# Patient Record
Sex: MALE | HISPANIC OR LATINO | URBAN - METROPOLITAN AREA
[De-identification: names, ages, dates, MRNs, and addresses within clinical notes are randomized per-mention and may not be internally consistent; named-entity substitution may affect disease eponyms.]

---

## 2023-03-08 ENCOUNTER — OFFICE (OUTPATIENT)
Dept: URBAN - METROPOLITAN AREA CLINIC 94 | Facility: CLINIC | Age: 73
Setting detail: OPHTHALMOLOGY
End: 2023-03-08
Payer: MEDICAID

## 2023-03-08 DIAGNOSIS — H35.033: ICD-10-CM

## 2023-03-08 DIAGNOSIS — H16.222: ICD-10-CM

## 2023-03-08 DIAGNOSIS — H16.221: ICD-10-CM

## 2023-03-08 DIAGNOSIS — H35.373: ICD-10-CM

## 2023-03-08 DIAGNOSIS — H01.001: ICD-10-CM

## 2023-03-08 DIAGNOSIS — H16.223: ICD-10-CM

## 2023-03-08 DIAGNOSIS — H01.004: ICD-10-CM

## 2023-03-08 PROCEDURE — 83861 MICROFLUID ANALY TEARS: CPT | Performed by: OPHTHALMOLOGY

## 2023-03-08 PROCEDURE — 92014 COMPRE OPH EXAM EST PT 1/>: CPT | Performed by: OPHTHALMOLOGY

## 2023-03-08 ASSESSMENT — REFRACTION_MANIFEST
OD_VA1: 20/20
OS_VA1: 20/20
OS_ADD: +2.25
OD_CYLINDER: -0.50
OD_VA2: 20/20
OD_AXIS: 100
OD_SPHERE: PLANO
OS_SPHERE: PLANO
OS_ADD: +2.25
OD_ADD: +2.25
OD_SPHERE: PL
OS_CYLINDER: -0.50
OS_SPHERE: +2.00
OS_VA1: 20/20
OS_VA2: 20/20
OD_VA1: 20/20
OS_AXIS: 85
OD_ADD: +2.25

## 2023-03-08 ASSESSMENT — REFRACTION_AUTOREFRACTION
OS_SPHERE: 0.00
OS_CYLINDER: 0.00
OD_CYLINDER: -0.75
OD_SPHERE: 0.00
OS_AXIS: 000
OD_AXIS: 089

## 2023-03-08 ASSESSMENT — REFRACTION_CURRENTRX
OS_CYLINDER: -0.75
OS_OVR_VA: 20/
OS_ADD: +3.25
OS_OVR_VA: 20/
OD_SPHERE: +1.50
OD_ADD: +2.75
OD_OVR_VA: 20/
OD_OVR_VA: 20/
OS_SPHERE: +2.50
OS_ADD: +2.00
OD_ADD: +2.00
OD_CYLINDER: -0.50
OD_AXIS: 092
OS_VPRISM_DIRECTION: PROGS
OD_VPRISM_DIRECTION: PROGS
OS_AXIS: 077

## 2023-03-08 ASSESSMENT — KERATOMETRY
OD_K2POWER_DIOPTERS: 44.25
OD_AXISANGLE_DEGREES: 090
OS_K1POWER_DIOPTERS: 44.00
OD_K1POWER_DIOPTERS: 44.25
OS_AXISANGLE_DEGREES: 072
METHOD_AUTO_MANUAL: AUTO
OS_K2POWER_DIOPTERS: 44.50

## 2023-03-08 ASSESSMENT — AXIALLENGTH_DERIVED
OS_AL: 22.6719
OD_AL: 23.4632
OS_AL: 23.3196

## 2023-03-08 ASSESSMENT — SPHEQUIV_DERIVED
OS_SPHEQUIV: 0
OS_SPHEQUIV: 1.75
OD_SPHEQUIV: -0.375

## 2023-03-08 ASSESSMENT — VISUAL ACUITY
OD_BCVA: 20/20
OS_BCVA: 20/20-1

## 2023-03-08 ASSESSMENT — LID EXAM ASSESSMENTS
OD_BLEPHARITIS: T
OS_BLEPHARITIS: T

## 2023-03-08 ASSESSMENT — CONFRONTATIONAL VISUAL FIELD TEST (CVF)
OD_FINDINGS: FULL
OS_FINDINGS: FULL

## 2023-03-08 ASSESSMENT — SUPERFICIAL PUNCTATE KERATITIS (SPK)
OS_SPK: T
OD_SPK: 1+

## 2023-03-08 ASSESSMENT — TONOMETRY
OS_IOP_MMHG: 14
OD_IOP_MMHG: 14

## 2023-03-17 ENCOUNTER — EMERGENCY (EMERGENCY)
Facility: HOSPITAL | Age: 73
LOS: 1 days | Discharge: HOME | End: 2023-03-17
Attending: EMERGENCY MEDICINE
Payer: COMMERCIAL

## 2023-03-17 VITALS
HEIGHT: 68 IN | TEMPERATURE: 102 F | RESPIRATION RATE: 20 BRPM | HEART RATE: 120 BPM | WEIGHT: 164.91 LBS | SYSTOLIC BLOOD PRESSURE: 120 MMHG | DIASTOLIC BLOOD PRESSURE: 65 MMHG | OXYGEN SATURATION: 100 %

## 2023-03-17 VITALS
SYSTOLIC BLOOD PRESSURE: 115 MMHG | DIASTOLIC BLOOD PRESSURE: 67 MMHG | TEMPERATURE: 100 F | RESPIRATION RATE: 16 BRPM | HEART RATE: 95 BPM | OXYGEN SATURATION: 98 %

## 2023-03-17 LAB
ALBUMIN SERPL ELPH-MCNC: 3.9 G/DL — SIGNIFICANT CHANGE UP (ref 3.3–5.2)
ALP SERPL-CCNC: 70 U/L — SIGNIFICANT CHANGE UP (ref 40–120)
ALT FLD-CCNC: 15 U/L — SIGNIFICANT CHANGE UP
ANION GAP SERPL CALC-SCNC: 11 MMOL/L — SIGNIFICANT CHANGE UP (ref 5–17)
ANISOCYTOSIS BLD QL: SLIGHT — SIGNIFICANT CHANGE UP
APPEARANCE UR: CLEAR — SIGNIFICANT CHANGE UP
APTT BLD: 22.3 SEC — LOW (ref 27.5–35.5)
AST SERPL-CCNC: 21 U/L — SIGNIFICANT CHANGE UP
BASOPHILS # BLD AUTO: 0.04 K/UL — SIGNIFICANT CHANGE UP (ref 0–0.2)
BASOPHILS NFR BLD AUTO: 0.6 % — SIGNIFICANT CHANGE UP (ref 0–2)
BILIRUB SERPL-MCNC: 0.5 MG/DL — SIGNIFICANT CHANGE UP (ref 0.4–2)
BILIRUB UR-MCNC: NEGATIVE — SIGNIFICANT CHANGE UP
BUN SERPL-MCNC: 21.3 MG/DL — HIGH (ref 8–20)
CALCIUM SERPL-MCNC: 8.5 MG/DL — SIGNIFICANT CHANGE UP (ref 8.4–10.5)
CHLORIDE SERPL-SCNC: 100 MMOL/L — SIGNIFICANT CHANGE UP (ref 96–108)
CO2 SERPL-SCNC: 27 MMOL/L — SIGNIFICANT CHANGE UP (ref 22–29)
COLOR SPEC: YELLOW — SIGNIFICANT CHANGE UP
CREAT SERPL-MCNC: 1.22 MG/DL — SIGNIFICANT CHANGE UP (ref 0.5–1.3)
DIFF PNL FLD: NEGATIVE — SIGNIFICANT CHANGE UP
EGFR: 63 ML/MIN/1.73M2 — SIGNIFICANT CHANGE UP
EOSINOPHIL # BLD AUTO: 0.03 K/UL — SIGNIFICANT CHANGE UP (ref 0–0.5)
EOSINOPHIL NFR BLD AUTO: 0.4 % — SIGNIFICANT CHANGE UP (ref 0–6)
GLUCOSE SERPL-MCNC: 104 MG/DL — HIGH (ref 70–99)
GLUCOSE UR QL: NEGATIVE MG/DL — SIGNIFICANT CHANGE UP
HCT VFR BLD CALC: 39.2 % — SIGNIFICANT CHANGE UP (ref 39–50)
HGB BLD-MCNC: 12.1 G/DL — LOW (ref 13–17)
HYPOCHROMIA BLD QL: SLIGHT — SIGNIFICANT CHANGE UP
IMM GRANULOCYTES NFR BLD AUTO: 0.1 % — SIGNIFICANT CHANGE UP (ref 0–0.9)
INR BLD: 1.09 RATIO — SIGNIFICANT CHANGE UP (ref 0.88–1.16)
KETONES UR-MCNC: ABNORMAL
LEUKOCYTE ESTERASE UR-ACNC: NEGATIVE — SIGNIFICANT CHANGE UP
LYMPHOCYTES # BLD AUTO: 0.71 K/UL — LOW (ref 1–3.3)
LYMPHOCYTES # BLD AUTO: 10.3 % — LOW (ref 13–44)
MACROCYTES BLD QL: SLIGHT — SIGNIFICANT CHANGE UP
MANUAL SMEAR VERIFICATION: SIGNIFICANT CHANGE UP
MCHC RBC-ENTMCNC: 23.1 PG — LOW (ref 27–34)
MCHC RBC-ENTMCNC: 30.9 GM/DL — LOW (ref 32–36)
MCV RBC AUTO: 74.8 FL — LOW (ref 80–100)
MICROCYTES BLD QL: SLIGHT — SIGNIFICANT CHANGE UP
MONOCYTES # BLD AUTO: 0.07 K/UL — SIGNIFICANT CHANGE UP (ref 0–0.9)
MONOCYTES NFR BLD AUTO: 1 % — LOW (ref 2–14)
NEUTROPHILS # BLD AUTO: 6.01 K/UL — SIGNIFICANT CHANGE UP (ref 1.8–7.4)
NEUTROPHILS NFR BLD AUTO: 87.6 % — HIGH (ref 43–77)
NITRITE UR-MCNC: NEGATIVE — SIGNIFICANT CHANGE UP
OVALOCYTES BLD QL SMEAR: SLIGHT — SIGNIFICANT CHANGE UP
PH UR: 6 — SIGNIFICANT CHANGE UP (ref 5–8)
PLAT MORPH BLD: NORMAL — SIGNIFICANT CHANGE UP
PLATELET # BLD AUTO: 258 K/UL — SIGNIFICANT CHANGE UP (ref 150–400)
POIKILOCYTOSIS BLD QL AUTO: SLIGHT — SIGNIFICANT CHANGE UP
POLYCHROMASIA BLD QL SMEAR: SLIGHT — SIGNIFICANT CHANGE UP
POTASSIUM SERPL-MCNC: 3.9 MMOL/L — SIGNIFICANT CHANGE UP (ref 3.5–5.3)
POTASSIUM SERPL-SCNC: 3.9 MMOL/L — SIGNIFICANT CHANGE UP (ref 3.5–5.3)
PROT SERPL-MCNC: 7.3 G/DL — SIGNIFICANT CHANGE UP (ref 6.6–8.7)
PROT UR-MCNC: NEGATIVE — SIGNIFICANT CHANGE UP
PROTHROM AB SERPL-ACNC: 12.7 SEC — SIGNIFICANT CHANGE UP (ref 10.5–13.4)
RBC # BLD: 5.24 M/UL — SIGNIFICANT CHANGE UP (ref 4.2–5.8)
RBC # FLD: 16.3 % — HIGH (ref 10.3–14.5)
RBC BLD AUTO: ABNORMAL
SODIUM SERPL-SCNC: 138 MMOL/L — SIGNIFICANT CHANGE UP (ref 135–145)
SP GR SPEC: 1.01 — SIGNIFICANT CHANGE UP (ref 1.01–1.02)
UROBILINOGEN FLD QL: NEGATIVE MG/DL — SIGNIFICANT CHANGE UP
WBC # BLD: 6.87 K/UL — SIGNIFICANT CHANGE UP (ref 3.8–10.5)
WBC # FLD AUTO: 6.87 K/UL — SIGNIFICANT CHANGE UP (ref 3.8–10.5)

## 2023-03-17 PROCEDURE — 73630 X-RAY EXAM OF FOOT: CPT | Mod: 26,LT

## 2023-03-17 PROCEDURE — 71045 X-RAY EXAM CHEST 1 VIEW: CPT | Mod: 26

## 2023-03-17 PROCEDURE — 99285 EMERGENCY DEPT VISIT HI MDM: CPT

## 2023-03-17 PROCEDURE — 93010 ELECTROCARDIOGRAM REPORT: CPT

## 2023-03-17 RX ORDER — LIDOCAINE HCL 20 MG/ML
20 VIAL (ML) INJECTION ONCE
Refills: 0 | Status: DISCONTINUED | OUTPATIENT
Start: 2023-03-17 | End: 2023-03-24

## 2023-03-17 RX ORDER — ACETAMINOPHEN 500 MG
650 TABLET ORAL ONCE
Refills: 0 | Status: COMPLETED | OUTPATIENT
Start: 2023-03-17 | End: 2023-03-17

## 2023-03-17 RX ORDER — SODIUM CHLORIDE 9 MG/ML
1000 INJECTION INTRAMUSCULAR; INTRAVENOUS; SUBCUTANEOUS ONCE
Refills: 0 | Status: COMPLETED | OUTPATIENT
Start: 2023-03-17 | End: 2023-03-17

## 2023-03-17 RX ADMIN — SODIUM CHLORIDE 1000 MILLILITER(S): 9 INJECTION INTRAMUSCULAR; INTRAVENOUS; SUBCUTANEOUS at 09:53

## 2023-03-17 RX ADMIN — Medication 650 MILLIGRAM(S): at 09:54

## 2023-03-17 NOTE — ED ADULT NURSE REASSESSMENT NOTE - NS ED NURSE REASSESS COMMENT FT1
Pt provided urinal and educated on need for urine sample. Pt verbalized understanding and will try to urinate when able to.

## 2023-03-17 NOTE — ED PROVIDER NOTE - PATIENT PORTAL LINK FT
You can access the FollowMyHealth Patient Portal offered by [facility] by registering at the following website: [email or web address]. By joining [employer]’s FollowMyHealth portal, you will also be able to view your health information using other applications (apps) compatible with our system.

## 2023-03-17 NOTE — CONSULT NOTE ADULT - SUBJECTIVE AND OBJECTIVE BOX
Patient is a 72y old  Male who presents with a chief complaint of Left Foot Pain    Podiatry HPI: 72 year old male with no significant pmhx per patient who presents for left foot pain. States that he dropped a pellet on monday onto his left foot towards the big toe. States that he is having difficult walking due to the pain. States that he was worried if it was infected. Denies any other pedal complaints or issues.  utilized and patient's family member was at bedside to help with translation as well.     Vital Signs Last 24 Hrs  T(C): 38.5 (17 Mar 2023 11:04), Max: 38.9 (17 Mar 2023 09:15)  T(F): 101.3 (17 Mar 2023 11:04), Max: 102 (17 Mar 2023 09:15)  HR: 94 (17 Mar 2023 10:44) (94 - 120)  BP: 121/66 (17 Mar 2023 10:15) (120/65 - 121/66)  BP(mean): --  RR: 20 (17 Mar 2023 09:15) (20 - 20)  SpO2: 100% (17 Mar 2023 09:15) (100% - 100%)    Parameters below as of 17 Mar 2023 09:15  Patient On (Oxygen Delivery Method): room air      LABS                        12.1   6.87  )-----------( 258      ( 17 Mar 2023 09:35 )             39.2               03-17    138  |  100  |  21.3<H>  ----------------------------<  104<H>  3.9   |  27.0  |  1.22    Ca    8.5      17 Mar 2023 09:35    TPro  7.3  /  Alb  3.9  /  TBili  0.5  /  DBili  x   /  AST  21  /  ALT  15  /  AlkPhos  70  03-17  WBC Count: 6.87 K/uL (03-17-23 @ 09:35)    PHYSICAL EXAM    LE Focused:    Vasc:  DP/PT Pulses were palpable. CFT intact to all digits/  Derm: Left Hallucal noted to be mycotic in nature with no drainage no malodor, no hematoma, nail plate well adhered, no increase in warmth noted.   Neuro: Protective Sensation was decreased, b/l  MSK: pain noted to the dorsal left hallux on palpation.    Imaging:   xray pending final read    A:   Left Foot Pain  Onychomycosis      P:  Patient evaluated, chart reviewed  Explained all clinical findings to the patient and patient's family member.   Xrays reviewed: pending final read  Dressed with bacitracin and DSD  Recommend pain medications per ED doc  Patient to follow up at [facility] podiatry clinic on the 6th floor due to insurance issues.  Discussed with Attending Dr. Abdi

## 2023-03-17 NOTE — ED ADULT NURSE NOTE - OBJECTIVE STATEMENT
Patient is A&Ox3, in NAD. Patient presents to ED c/o pain to left first toe + fever and chills since this morning. Patient states he dropped a pallet on his toe on Monday and wants to see a podiatrist because it hurts. Patient febrile and tachycardic on arrival. Denies sick contacts at home. Breath sounds clear and equal bilaterally. Abdomen soft, NT/ND. Skin is dry and hot. Sepsis workup in progress, medicated as per orders. Sinus tachycardia on CM, . Will continue to monitor.

## 2023-03-17 NOTE — ED ADULT NURSE NOTE - TEMPLATE
Patient is calling stating she would like a call back in regards to a prescription for pain that was advised on last visit.  
General
moderate assist (50% patients effort)

## 2023-03-17 NOTE — ED PROVIDER NOTE - CLINICAL SUMMARY MEDICAL DECISION MAKING FREE TEXT BOX
labs and diagnostic imaging results reviewed with patient labs and diagnostic imaging results reviewed with patient; podiatry assessment noted; patient feels comfortable with discharge and medical plan; PMD or clinic follow up recommended for reassessment. Patient is aware of signs/symptoms to return to the emergency department.

## 2023-03-18 ENCOUNTER — INPATIENT (INPATIENT)
Facility: HOSPITAL | Age: 73
LOS: 1 days | Discharge: HOME | DRG: 872 | End: 2023-03-20
Attending: STUDENT IN AN ORGANIZED HEALTH CARE EDUCATION/TRAINING PROGRAM | Admitting: STUDENT IN AN ORGANIZED HEALTH CARE EDUCATION/TRAINING PROGRAM
Payer: COMMERCIAL

## 2023-03-18 VITALS
HEART RATE: 64 BPM | SYSTOLIC BLOOD PRESSURE: 111 MMHG | OXYGEN SATURATION: 98 % | RESPIRATION RATE: 20 BRPM | TEMPERATURE: 98 F | HEIGHT: 68 IN | WEIGHT: 205.03 LBS | DIASTOLIC BLOOD PRESSURE: 73 MMHG

## 2023-03-18 DIAGNOSIS — R78.81 BACTEREMIA: ICD-10-CM

## 2023-03-18 DIAGNOSIS — Z98.890 OTHER SPECIFIED POSTPROCEDURAL STATES: Chronic | ICD-10-CM

## 2023-03-18 LAB
ALBUMIN SERPL ELPH-MCNC: 3.8 G/DL — SIGNIFICANT CHANGE UP (ref 3.3–5.2)
ALP SERPL-CCNC: 56 U/L — SIGNIFICANT CHANGE UP (ref 40–120)
ALT FLD-CCNC: 19 U/L — SIGNIFICANT CHANGE UP
ANION GAP SERPL CALC-SCNC: 11 MMOL/L — SIGNIFICANT CHANGE UP (ref 5–17)
APPEARANCE UR: CLEAR — SIGNIFICANT CHANGE UP
APTT BLD: 26.2 SEC — LOW (ref 27.5–35.5)
AST SERPL-CCNC: 26 U/L — SIGNIFICANT CHANGE UP
BACTERIA # UR AUTO: ABNORMAL
BASOPHILS # BLD AUTO: 0.08 K/UL — SIGNIFICANT CHANGE UP (ref 0–0.2)
BASOPHILS NFR BLD AUTO: 0.8 % — SIGNIFICANT CHANGE UP (ref 0–2)
BILIRUB SERPL-MCNC: 0.4 MG/DL — SIGNIFICANT CHANGE UP (ref 0.4–2)
BILIRUB UR-MCNC: NEGATIVE — SIGNIFICANT CHANGE UP
BUN SERPL-MCNC: 21.5 MG/DL — HIGH (ref 8–20)
CALCIUM SERPL-MCNC: 8.7 MG/DL — SIGNIFICANT CHANGE UP (ref 8.4–10.5)
CHLORIDE SERPL-SCNC: 101 MMOL/L — SIGNIFICANT CHANGE UP (ref 96–108)
CO2 SERPL-SCNC: 27 MMOL/L — SIGNIFICANT CHANGE UP (ref 22–29)
COLOR SPEC: YELLOW — SIGNIFICANT CHANGE UP
CREAT SERPL-MCNC: 1.07 MG/DL — SIGNIFICANT CHANGE UP (ref 0.5–1.3)
DIFF PNL FLD: ABNORMAL
E COLI DNA BLD POS QL NAA+NON-PROBE: SIGNIFICANT CHANGE UP
EGFR: 74 ML/MIN/1.73M2 — SIGNIFICANT CHANGE UP
EOSINOPHIL # BLD AUTO: 0.13 K/UL — SIGNIFICANT CHANGE UP (ref 0–0.5)
EOSINOPHIL NFR BLD AUTO: 1.3 % — SIGNIFICANT CHANGE UP (ref 0–6)
EPI CELLS # UR: SIGNIFICANT CHANGE UP
GLUCOSE SERPL-MCNC: 88 MG/DL — SIGNIFICANT CHANGE UP (ref 70–99)
GLUCOSE UR QL: NEGATIVE MG/DL — SIGNIFICANT CHANGE UP
GRAM STN FLD: SIGNIFICANT CHANGE UP
HCT VFR BLD CALC: 38.9 % — LOW (ref 39–50)
HGB BLD-MCNC: 11.8 G/DL — LOW (ref 13–17)
IMM GRANULOCYTES NFR BLD AUTO: 0.3 % — SIGNIFICANT CHANGE UP (ref 0–0.9)
INR BLD: 1.11 RATIO — SIGNIFICANT CHANGE UP (ref 0.88–1.16)
KETONES UR-MCNC: ABNORMAL
LACTATE BLDV-MCNC: 1.8 MMOL/L — SIGNIFICANT CHANGE UP (ref 0.5–2)
LEUKOCYTE ESTERASE UR-ACNC: NEGATIVE — SIGNIFICANT CHANGE UP
LYMPHOCYTES # BLD AUTO: 1.29 K/UL — SIGNIFICANT CHANGE UP (ref 1–3.3)
LYMPHOCYTES # BLD AUTO: 13.3 % — SIGNIFICANT CHANGE UP (ref 13–44)
MCHC RBC-ENTMCNC: 22.9 PG — LOW (ref 27–34)
MCHC RBC-ENTMCNC: 30.3 GM/DL — LOW (ref 32–36)
MCV RBC AUTO: 75.4 FL — LOW (ref 80–100)
METHOD TYPE: SIGNIFICANT CHANGE UP
MONOCYTES # BLD AUTO: 0.95 K/UL — HIGH (ref 0–0.9)
MONOCYTES NFR BLD AUTO: 9.8 % — SIGNIFICANT CHANGE UP (ref 2–14)
NEUTROPHILS # BLD AUTO: 7.22 K/UL — SIGNIFICANT CHANGE UP (ref 1.8–7.4)
NEUTROPHILS NFR BLD AUTO: 74.5 % — SIGNIFICANT CHANGE UP (ref 43–77)
NITRITE UR-MCNC: NEGATIVE — SIGNIFICANT CHANGE UP
PH UR: 6 — SIGNIFICANT CHANGE UP (ref 5–8)
PLATELET # BLD AUTO: 218 K/UL — SIGNIFICANT CHANGE UP (ref 150–400)
POTASSIUM SERPL-MCNC: 4.6 MMOL/L — SIGNIFICANT CHANGE UP (ref 3.5–5.3)
POTASSIUM SERPL-SCNC: 4.6 MMOL/L — SIGNIFICANT CHANGE UP (ref 3.5–5.3)
PROT SERPL-MCNC: 7 G/DL — SIGNIFICANT CHANGE UP (ref 6.6–8.7)
PROT UR-MCNC: NEGATIVE — SIGNIFICANT CHANGE UP
PROTHROM AB SERPL-ACNC: 12.9 SEC — SIGNIFICANT CHANGE UP (ref 10.5–13.4)
RBC # BLD: 5.16 M/UL — SIGNIFICANT CHANGE UP (ref 4.2–5.8)
RBC # FLD: 16.6 % — HIGH (ref 10.3–14.5)
RBC CASTS # UR COMP ASSIST: SIGNIFICANT CHANGE UP /HPF (ref 0–4)
SARS-COV-2 RNA SPEC QL NAA+PROBE: SIGNIFICANT CHANGE UP
SODIUM SERPL-SCNC: 139 MMOL/L — SIGNIFICANT CHANGE UP (ref 135–145)
SP GR SPEC: 1.01 — SIGNIFICANT CHANGE UP (ref 1.01–1.02)
SPECIMEN SOURCE: SIGNIFICANT CHANGE UP
SPECIMEN SOURCE: SIGNIFICANT CHANGE UP
TROPONIN T SERPL-MCNC: <0.01 NG/ML — SIGNIFICANT CHANGE UP (ref 0–0.06)
UROBILINOGEN FLD QL: NEGATIVE MG/DL — SIGNIFICANT CHANGE UP
WBC # BLD: 9.7 K/UL — SIGNIFICANT CHANGE UP (ref 3.8–10.5)
WBC # FLD AUTO: 9.7 K/UL — SIGNIFICANT CHANGE UP (ref 3.8–10.5)
WBC UR QL: NEGATIVE /HPF — SIGNIFICANT CHANGE UP (ref 0–5)

## 2023-03-18 PROCEDURE — 71045 X-RAY EXAM CHEST 1 VIEW: CPT

## 2023-03-18 PROCEDURE — T1013: CPT

## 2023-03-18 PROCEDURE — 93010 ELECTROCARDIOGRAM REPORT: CPT

## 2023-03-18 PROCEDURE — 99223 1ST HOSP IP/OBS HIGH 75: CPT

## 2023-03-18 PROCEDURE — 99285 EMERGENCY DEPT VISIT HI MDM: CPT | Mod: 25

## 2023-03-18 PROCEDURE — 81003 URINALYSIS AUTO W/O SCOPE: CPT

## 2023-03-18 PROCEDURE — 36415 COLL VENOUS BLD VENIPUNCTURE: CPT

## 2023-03-18 PROCEDURE — 71045 X-RAY EXAM CHEST 1 VIEW: CPT | Mod: 26

## 2023-03-18 PROCEDURE — 85730 THROMBOPLASTIN TIME PARTIAL: CPT

## 2023-03-18 PROCEDURE — 73630 X-RAY EXAM OF FOOT: CPT

## 2023-03-18 PROCEDURE — 87040 BLOOD CULTURE FOR BACTERIA: CPT

## 2023-03-18 PROCEDURE — 87150 DNA/RNA AMPLIFIED PROBE: CPT

## 2023-03-18 PROCEDURE — 96360 HYDRATION IV INFUSION INIT: CPT

## 2023-03-18 PROCEDURE — 83605 ASSAY OF LACTIC ACID: CPT

## 2023-03-18 PROCEDURE — 85025 COMPLETE CBC W/AUTO DIFF WBC: CPT

## 2023-03-18 PROCEDURE — 87077 CULTURE AEROBIC IDENTIFY: CPT

## 2023-03-18 PROCEDURE — U0005: CPT

## 2023-03-18 PROCEDURE — 93005 ELECTROCARDIOGRAM TRACING: CPT

## 2023-03-18 PROCEDURE — 99285 EMERGENCY DEPT VISIT HI MDM: CPT

## 2023-03-18 PROCEDURE — U0003: CPT

## 2023-03-18 PROCEDURE — 85610 PROTHROMBIN TIME: CPT

## 2023-03-18 PROCEDURE — 80053 COMPREHEN METABOLIC PANEL: CPT

## 2023-03-18 PROCEDURE — 87186 SC STD MICRODIL/AGAR DIL: CPT

## 2023-03-18 RX ORDER — BACITRACIN ZINC 500 UNIT/G
1 OINTMENT IN PACKET (EA) TOPICAL DAILY
Refills: 0 | Status: DISCONTINUED | OUTPATIENT
Start: 2023-03-18 | End: 2023-03-20

## 2023-03-18 RX ORDER — CEFTRIAXONE 500 MG/1
1000 INJECTION, POWDER, FOR SOLUTION INTRAMUSCULAR; INTRAVENOUS ONCE
Refills: 0 | Status: COMPLETED | OUTPATIENT
Start: 2023-03-18 | End: 2023-03-18

## 2023-03-18 RX ORDER — AZITHROMYCIN 500 MG/1
500 TABLET, FILM COATED ORAL ONCE
Refills: 0 | Status: DISCONTINUED | OUTPATIENT
Start: 2023-03-18 | End: 2023-03-18

## 2023-03-18 RX ORDER — ENOXAPARIN SODIUM 100 MG/ML
40 INJECTION SUBCUTANEOUS EVERY 24 HOURS
Refills: 0 | Status: DISCONTINUED | OUTPATIENT
Start: 2023-03-18 | End: 2023-03-20

## 2023-03-18 RX ORDER — SODIUM CHLORIDE 9 MG/ML
2900 INJECTION, SOLUTION INTRAVENOUS ONCE
Refills: 0 | Status: COMPLETED | OUTPATIENT
Start: 2023-03-18 | End: 2023-03-18

## 2023-03-18 RX ORDER — LANOLIN ALCOHOL/MO/W.PET/CERES
3 CREAM (GRAM) TOPICAL AT BEDTIME
Refills: 0 | Status: DISCONTINUED | OUTPATIENT
Start: 2023-03-18 | End: 2023-03-20

## 2023-03-18 RX ORDER — ACETAMINOPHEN 500 MG
650 TABLET ORAL EVERY 6 HOURS
Refills: 0 | Status: DISCONTINUED | OUTPATIENT
Start: 2023-03-18 | End: 2023-03-20

## 2023-03-18 RX ORDER — ASPIRIN/CALCIUM CARB/MAGNESIUM 324 MG
81 TABLET ORAL DAILY
Refills: 0 | Status: DISCONTINUED | OUTPATIENT
Start: 2023-03-18 | End: 2023-03-20

## 2023-03-18 RX ORDER — ONDANSETRON 8 MG/1
4 TABLET, FILM COATED ORAL EVERY 8 HOURS
Refills: 0 | Status: DISCONTINUED | OUTPATIENT
Start: 2023-03-18 | End: 2023-03-20

## 2023-03-18 RX ORDER — CEFTRIAXONE 500 MG/1
1000 INJECTION, POWDER, FOR SOLUTION INTRAMUSCULAR; INTRAVENOUS ONCE
Refills: 0 | Status: DISCONTINUED | OUTPATIENT
Start: 2023-03-18 | End: 2023-03-18

## 2023-03-18 RX ADMIN — CEFTRIAXONE 1000 MILLIGRAM(S): 500 INJECTION, POWDER, FOR SOLUTION INTRAMUSCULAR; INTRAVENOUS at 10:48

## 2023-03-18 RX ADMIN — SODIUM CHLORIDE 2900 MILLILITER(S): 9 INJECTION, SOLUTION INTRAVENOUS at 10:48

## 2023-03-18 NOTE — H&P ADULT - NSHPPHYSICALEXAM_GEN_ALL_CORE
VITALS:   T(C): 36.7 (03-18-23 @ 12:19), Max: 37.7 (03-17-23 @ 15:48)  HR: 70 (03-18-23 @ 12:19) (64 - 95)  BP: 103/67 (03-18-23 @ 12:19) (103/67 - 115/67)  RR: 19 (03-18-23 @ 12:19) (16 - 20)  SpO2: 99% (03-18-23 @ 12:19) (98% - 99%)    GENERAL: NAD, lying in bed comfortably  HEAD:  Atraumatic, Normocephalic  EYES: EOMI, PERRLA, conjunctiva and sclera clear  ENT: Moist mucous membranes  NECK: Supple, No JVD  CHEST/LUNG: Clear to auscultation bilaterally; No rales, rhonchi, wheezing, or rubs. Unlabored respirations  HEART: Regular rate and rhythm; No murmurs, rubs, or gallops  ABDOMEN: BSx4; Soft, nontender, nondistended  EXTREMITIES:  2+ Peripheral Pulses, brisk capillary refill. No clubbing, cyanosis, or edema. Left foot in dressing  NERVOUS SYSTEM:  A&Ox3, no focal deficits   SKIN: No rashes or lesions  PSYCH: Normal affect, euthymic mood

## 2023-03-18 NOTE — ED PROVIDER NOTE - PHYSICAL EXAMINATION
Alert, lucid, and in no apparent distress. Pt is normocephalic, atraumatic.  Pupils are equal, round, lips pink, moist mucous membranes, tongue midline. Neck supple.   Lungs clear to ausultation. Heart regular rate and rhythm, normal S1, S2  Abdomen is soft, nontender, no pulsatile mass, no masses, no distension, no rebound.   Non-focal sensory, 5 out of 5 motor strength, no dysmetria, fluent, goal directed speech. CN2 to 12 intact. Skin without rash,    Normal mentation, does not appear agitated

## 2023-03-18 NOTE — H&P ADULT - NSHPLABSRESULTS_GEN_ALL_CORE
LABS:                        11.8   9.70  )-----------( 218      ( 18 Mar 2023 10:55 )             38.9     03-18    139  |  101  |  21.5<H>  ----------------------------<  88  4.6   |  27.0  |  1.07    Ca    8.7      18 Mar 2023 10:55    TPro  7.0  /  Alb  3.8  /  TBili  0.4  /  DBili  x   /  AST  26  /  ALT  19  /  AlkPhos  56  03-18    PT/INR - ( 18 Mar 2023 10:55 )   PT: 12.9 sec;   INR: 1.11 ratio         PTT - ( 18 Mar 2023 10:55 )  PTT:26.2 sec  CARDIAC MARKERS ( 18 Mar 2023 10:55 )  x     / <0.01 ng/mL / x     / x     / x          Urinalysis Basic - ( 18 Mar 2023 12:46 )    Color: Yellow / Appearance: Clear / S.010 / pH: x  Gluc: x / Ketone: Trace  / Bili: Negative / Urobili: Negative mg/dL   Blood: x / Protein: Negative / Nitrite: Negative   Leuk Esterase: Negative / RBC: 0-2 /HPF / WBC Negative /HPF   Sq Epi: x / Non Sq Epi: Occasional / Bacteria: Occasional        Culture - Blood (collected 17 Mar 2023 09:35)  Source: .Blood Blood-Peripheral  Gram Stain (18 Mar 2023 04:49):    Growth in anaerobic bottle: Gram Negative Rods    Growth in aerobic bottle: Gram Negative Rods  Preliminary Report (18 Mar 2023 04:50):    Growth in anaerobic bottle: Gram Negative Rods    ***Blood Panel PCR results on this specimen are available    approximately 3 hours after the Gram stain result.***    Gram stain, PCR, and/or culture results may not always    correspond due to difference in methodologies.    ************************************************************    This PCR assay was performed by multiplex PCR. This    Assay tests for 66 bacterial and resistance gene targets.    Please refer to the [facility] test directory    at [email or web address] for details.    Growth in aerobic bottle: Gram Negative Rods  Organism: Blood Culture PCR (18 Mar 2023 05:48)  Organism: Blood Culture PCR (18 Mar 2023 05:48)    Culture - Blood (collected 17 Mar 2023 09:30)  Source: .Blood Blood-Peripheral  Gram Stain (18 Mar 2023 09:34):    Growth in aerobic bottle: Gram Negative Rods    Growth in anaerobic bottle: Gram Negative Rods  Preliminary Report (18 Mar 2023 09:34):    Growth in aerobic bottle: Gram Negative Rods    Growth in anaerobic bottle: Gram Negative Rods

## 2023-03-18 NOTE — H&P ADULT - HISTORY OF PRESENT ILLNESS
73yo M PMHx HTN, called to return to the hospital for positive blood cultures. History obtained from daughter who speaks English at bedside. Per daughter and patient, patient had a small trauma to the right big toe nails on Monday and the nail turned black as a result. No open wounds/drainage. Noted at work by colleagues to have an episode of chill and diaphoresis with some weakness, and called the ambulance. He was evaluated in the ED on 3/17. Seen by podiatry yesterday and toe nail dressed. No s/s of infection noted and discharged from the ED. Called in today 3/18 for GNR in blood. Patient reports 100.3 F yesterday when he returned as well as low BP. Denies cough, CP, SOB, n/v/c/d, nor urinary concerns. H/o decrease urine output/straining for the past at least 6 months which he attributed to BPH.    ED vitals stable, s/p CTX in the ED, and admitted for further care.

## 2023-03-18 NOTE — ED ADULT NURSE NOTE - OBJECTIVE STATEMENT
pt c/o call back with +blood cultures from yesterday, patient had infected right big toe, is being followed by Podiatry and has bandage wrap around toe, last night pt stated that he had 100.3 fever and shakes as well as low blood pressure in the 80's systolic. patient took motrin last night and stated that he felt better after. patient has normal vitals at this time.

## 2023-03-18 NOTE — ED PROVIDER NOTE - CLINICAL SUMMARY MEDICAL DECISION MAKING FREE TEXT BOX
pt with fever, chills and recent injury to rt foot with positive blood culture gram negative rods; eval bacteremia

## 2023-03-18 NOTE — H&P ADULT - ASSESSMENT
71yo M PMHx HTN, admitted for E-coli bacteremia.    #E-coli bactereima  #sepsis not present on admission  unclear source  f/u UCx and repeat BCx to clear  neg lactate  c/w CTX for now  monitor vitals/CBC and fever curve  UA not positive  given no clear source, will obtain CT C/A/P  ID called, marija recs    #HTN  BP soft with bacteriemia  hold home lisinopril     #Nail avulsion  seen by podiatry on 3/17  does not appeared infected  c/w wound care with bacitracin and DSD while admitted  follow up at [facility] podiatry clinic on the 6th floor outpatient     #Anemia  Hg 11.8  check iron studies, B12 and folate level  trend CBC    DVT ppx: Lovenox  Diet: regualr  Dispo: likely 2-3 days pending workup

## 2023-03-18 NOTE — H&P ADULT - NSHPREVIEWOFSYSTEMS_GEN_ALL_CORE
REVIEW OF SYSTEMS:    CONSTITUTIONAL: + weakness, fever and chill  EYES: No vertigo or throat pain  ENT: No visual changes, eye pain  MOUTH: moist shabnam mucosal, no mouth ulcers  NECK: No pain or stiffness  RESPIRATORY: No cough, wheezing, hemoptysis; No shortness of breath  CARDIOVASCULAR: No chest pain or palpitations  GASTROINTESTINAL: No abdominal or epigastric pain. No nausea, vomiting, or hematemesis; No diarrhea or constipation. No melena or hematochezia.  GENITOURINARY: No dysuria, frequency or hematuria. + urinary straining   NEUROLOGICAL: No numbness or weakness  SKIN: No itching, rashes  PSYCH: No anxiety or depression

## 2023-03-18 NOTE — ED ADULT TRIAGE NOTE - CHIEF COMPLAINT QUOTE
Pt received call back for positive blood culture - pt  has no complaints states he is feeling better . Pt had 100.3 fever last night

## 2023-03-18 NOTE — ED PROVIDER NOTE - OBJECTIVE STATEMENT
73 yo male no significant pmh comes to ed with Positive blood cultures  x 2 ( gram negative rods). as per daughter, after leaving hospital pt had low blood pressure and continue with fever and chills;  pt denies headache, sore throat, cough , abdominal pain , vomiting or diarrhea; pt seen by podiatry fo injury of rt 1st toe from pallet 5 days ago.

## 2023-03-19 LAB
-  AMIKACIN: SIGNIFICANT CHANGE UP
-  AMPICILLIN/SULBACTAM: SIGNIFICANT CHANGE UP
-  AMPICILLIN: SIGNIFICANT CHANGE UP
-  AZTREONAM: SIGNIFICANT CHANGE UP
-  CEFAZOLIN: SIGNIFICANT CHANGE UP
-  CEFEPIME: SIGNIFICANT CHANGE UP
-  CEFOXITIN: SIGNIFICANT CHANGE UP
-  CEFTRIAXONE: SIGNIFICANT CHANGE UP
-  CIPROFLOXACIN: SIGNIFICANT CHANGE UP
-  ERTAPENEM: SIGNIFICANT CHANGE UP
-  GENTAMICIN: SIGNIFICANT CHANGE UP
-  IMIPENEM: SIGNIFICANT CHANGE UP
-  LEVOFLOXACIN: SIGNIFICANT CHANGE UP
-  MEROPENEM: SIGNIFICANT CHANGE UP
-  PIPERACILLIN/TAZOBACTAM: SIGNIFICANT CHANGE UP
-  TOBRAMYCIN: SIGNIFICANT CHANGE UP
-  TRIMETHOPRIM/SULFAMETHOXAZOLE: SIGNIFICANT CHANGE UP
ANION GAP SERPL CALC-SCNC: 11 MMOL/L — SIGNIFICANT CHANGE UP (ref 5–17)
BUN SERPL-MCNC: 23.1 MG/DL — HIGH (ref 8–20)
CALCIUM SERPL-MCNC: 8.5 MG/DL — SIGNIFICANT CHANGE UP (ref 8.4–10.5)
CHLORIDE SERPL-SCNC: 104 MMOL/L — SIGNIFICANT CHANGE UP (ref 96–108)
CO2 SERPL-SCNC: 25 MMOL/L — SIGNIFICANT CHANGE UP (ref 22–29)
CREAT SERPL-MCNC: 1.21 MG/DL — SIGNIFICANT CHANGE UP (ref 0.5–1.3)
CULTURE RESULTS: NO GROWTH — SIGNIFICANT CHANGE UP
CULTURE RESULTS: SIGNIFICANT CHANGE UP
EGFR: 64 ML/MIN/1.73M2 — SIGNIFICANT CHANGE UP
FERRITIN SERPL-MCNC: 27 NG/ML — LOW (ref 30–400)
FOLATE SERPL-MCNC: 9.7 NG/ML — SIGNIFICANT CHANGE UP
GLUCOSE SERPL-MCNC: 91 MG/DL — SIGNIFICANT CHANGE UP (ref 70–99)
HCT VFR BLD CALC: 36.3 % — LOW (ref 39–50)
HCV AB S/CO SERPL IA: 0.14 S/CO — SIGNIFICANT CHANGE UP (ref 0–0.99)
HCV AB SERPL-IMP: SIGNIFICANT CHANGE UP
HGB BLD-MCNC: 10.9 G/DL — LOW (ref 13–17)
IRON SATN MFR SERPL: 21 UG/DL — LOW (ref 59–158)
IRON SATN MFR SERPL: 5 % — LOW (ref 16–55)
MAGNESIUM SERPL-MCNC: 1.7 MG/DL — LOW (ref 1.8–2.6)
MCHC RBC-ENTMCNC: 22.6 PG — LOW (ref 27–34)
MCHC RBC-ENTMCNC: 30 GM/DL — LOW (ref 32–36)
MCV RBC AUTO: 75.3 FL — LOW (ref 80–100)
METHOD TYPE: SIGNIFICANT CHANGE UP
ORGANISM # SPEC MICROSCOPIC CNT: SIGNIFICANT CHANGE UP
PHOSPHATE SERPL-MCNC: 2.8 MG/DL — SIGNIFICANT CHANGE UP (ref 2.4–4.7)
PLATELET # BLD AUTO: 239 K/UL — SIGNIFICANT CHANGE UP (ref 150–400)
POTASSIUM SERPL-MCNC: 4.9 MMOL/L — SIGNIFICANT CHANGE UP (ref 3.5–5.3)
POTASSIUM SERPL-SCNC: 4.9 MMOL/L — SIGNIFICANT CHANGE UP (ref 3.5–5.3)
RBC # BLD: 4.82 M/UL — SIGNIFICANT CHANGE UP (ref 4.2–5.8)
RBC # FLD: 16.8 % — HIGH (ref 10.3–14.5)
SODIUM SERPL-SCNC: 140 MMOL/L — SIGNIFICANT CHANGE UP (ref 135–145)
SPECIMEN SOURCE: SIGNIFICANT CHANGE UP
SPECIMEN SOURCE: SIGNIFICANT CHANGE UP
TIBC SERPL-MCNC: 459 UG/DL — HIGH (ref 220–430)
TRANSFERRIN SERPL-MCNC: 321 MG/DL — SIGNIFICANT CHANGE UP (ref 180–329)
VIT B12 SERPL-MCNC: 643 PG/ML — SIGNIFICANT CHANGE UP (ref 232–1245)
WBC # BLD: 8.59 K/UL — SIGNIFICANT CHANGE UP (ref 3.8–10.5)
WBC # FLD AUTO: 8.59 K/UL — SIGNIFICANT CHANGE UP (ref 3.8–10.5)

## 2023-03-19 PROCEDURE — 99232 SBSQ HOSP IP/OBS MODERATE 35: CPT

## 2023-03-19 PROCEDURE — 99223 1ST HOSP IP/OBS HIGH 75: CPT

## 2023-03-19 PROCEDURE — 74176 CT ABD & PELVIS W/O CONTRAST: CPT | Mod: 26

## 2023-03-19 PROCEDURE — 76775 US EXAM ABDO BACK WALL LIM: CPT | Mod: 26

## 2023-03-19 PROCEDURE — 73502 X-RAY EXAM HIP UNI 2-3 VIEWS: CPT | Mod: 26,RT

## 2023-03-19 PROCEDURE — 73552 X-RAY EXAM OF FEMUR 2/>: CPT | Mod: 26,RT

## 2023-03-19 PROCEDURE — 71250 CT THORAX DX C-: CPT | Mod: 26

## 2023-03-19 RX ORDER — MAGNESIUM SULFATE 500 MG/ML
1 VIAL (ML) INJECTION ONCE
Refills: 0 | Status: COMPLETED | OUTPATIENT
Start: 2023-03-19 | End: 2023-03-19

## 2023-03-19 RX ORDER — ASPIRIN/CALCIUM CARB/MAGNESIUM 324 MG
1 TABLET ORAL
Qty: 0 | Refills: 0 | DISCHARGE

## 2023-03-19 RX ORDER — FERROUS SULFATE 325(65) MG
325 TABLET ORAL DAILY
Refills: 0 | Status: DISCONTINUED | OUTPATIENT
Start: 2023-03-19 | End: 2023-03-20

## 2023-03-19 RX ORDER — IBUPROFEN 200 MG
0 TABLET ORAL
Qty: 0 | Refills: 0 | DISCHARGE

## 2023-03-19 RX ORDER — LISINOPRIL 2.5 MG/1
1 TABLET ORAL
Qty: 0 | Refills: 0 | DISCHARGE

## 2023-03-19 RX ORDER — CEFTRIAXONE 500 MG/1
1000 INJECTION, POWDER, FOR SOLUTION INTRAMUSCULAR; INTRAVENOUS EVERY 24 HOURS
Refills: 0 | Status: DISCONTINUED | OUTPATIENT
Start: 2023-03-19 | End: 2023-03-19

## 2023-03-19 RX ORDER — CEFTRIAXONE 500 MG/1
1000 INJECTION, POWDER, FOR SOLUTION INTRAMUSCULAR; INTRAVENOUS EVERY 24 HOURS
Refills: 0 | Status: DISCONTINUED | OUTPATIENT
Start: 2023-03-19 | End: 2023-03-20

## 2023-03-19 RX ORDER — LISINOPRIL/HYDROCHLOROTHIAZIDE 10-12.5 MG
1 TABLET ORAL
Qty: 0 | Refills: 0 | DISCHARGE

## 2023-03-19 RX ADMIN — Medication 1 APPLICATION(S): at 09:38

## 2023-03-19 RX ADMIN — Medication 100 GRAM(S): at 09:37

## 2023-03-19 RX ADMIN — Medication 325 MILLIGRAM(S): at 09:37

## 2023-03-19 RX ADMIN — CEFTRIAXONE 1000 MILLIGRAM(S): 500 INJECTION, POWDER, FOR SOLUTION INTRAMUSCULAR; INTRAVENOUS at 12:32

## 2023-03-19 RX ADMIN — Medication 81 MILLIGRAM(S): at 09:37

## 2023-03-19 RX ADMIN — ENOXAPARIN SODIUM 40 MILLIGRAM(S): 100 INJECTION SUBCUTANEOUS at 09:37

## 2023-03-19 NOTE — CHART NOTE - NSCHARTNOTEFT_GEN_A_CORE
Medicine PA-  Cd for pt c/o R. hip pain, pain has been intermittent since fall, no hx new trauma/ injury to area, no c/o pain elsewhere.    Vital Signs Last 24 Hrs  T(C): 36.5 (19 Mar 2023 22:52), Max: 36.9 (19 Mar 2023 03:37)  T(F): 97.7 (19 Mar 2023 22:52), Max: 98.5 (19 Mar 2023 03:37)  HR: 65 (19 Mar 2023 22:52) (62 - 71)  BP: 111/75 (19 Mar 2023 22:52) (111/75 - 152/84)  BP(mean): 86 (19 Mar 2023 19:12) (86 - 86)  RR: 18 (19 Mar 2023 22:52) (18 - 18)  SpO2: 98% (19 Mar 2023 22:52) (97% - 99%)    Parameters below as of 19 Mar 2023 22:52  Patient On (Oxygen Delivery Method): room air    PE:  Gen- Pt resting comfortably, in NAD  R. Hip/ext-   +tenderness along hip, no redness, swelling or ecchymosis  FROM R. hip/ LE without limitation or pain    Xray R. Hip/ Femur- no fx, pending final read    >R. Hip pain  -percocet x1  -warm compresses PRN  -call PA if status changes

## 2023-03-19 NOTE — CONSULT NOTE ADULT - SUBJECTIVE AND OBJECTIVE BOX
[facility]  INFECTIOUS DISEASES at [place] and [place]  =====================================================                               Reno Gilman MD*    Dedra Dumas MD*                             Curtis Diaz MD*     Marichuy Rushing MD*            Diplomates American Board of Internal Medicine & Infectious Diseases                * [place] Office - Appt - Tel  [phone] Fax [phone]                * [place] Office - Appt - Tel [phone] Fax [phone]                                  Hospital Consult line:  [phone]  =====================================================      [id number]  SALMA NAVAS        CC: Patient is a 72y old  Male who presents with a chief complaint of Bacteremia (19 Mar 2023 12:13)      HPI:  71yo M PMHx HTN, called to return to the hospital for positive blood cultures. History obtained from daughter who speaks English at bedside. Per daughter and patient, patient had a small trauma to the right big toe nails on Monday and the nail turned black as a result. No open wounds/drainage. Noted at work by colleagues to have an episode of chill and diaphoresis with some weakness, and called the ambulance. He was evaluated in the ED on 3/17. Seen by podiatry yesterday and toe nail dressed. No s/s of infection noted and discharged from the ED. Called in today 3/18 for GNR in blood. Patient reports 100.3 F yesterday when he returned as well as low BP. Denies cough, CP, SOB, n/v/c/d, nor urinary concerns. H/o decrease urine output/straining for the past at least 6 months which he attributed to BPH. ED vitals stable, s/p CTX in the ED, and admitted for further care.  (18 Mar 2023 14:57)    As above - 71 y/o M with HTN with sudden onset on rigors, diaphoresis and weakness while at work on 3/17. Seen  [facility] ED on 3/17 and sent home. Had fevers that night. Called back to the hospital on 3/18, after Bcx grew E. coli.     On my assessment - patient reports unchanged urinary frequency, straining and decreased output for many months. Sustained trauma to right big toe on 3/13 (nail turned black). No abdominal or flank pain. No nausea, vomiting or diarrhea.   ______________________________________________________  PAST MEDICAL & SURGICAL HISTORY:  HTN (hypertension)    H/O hernia repair      Social History:  Denies smoking, social ETOH use. No other drugs.     Occupation: works in an assembling factory     FAMILY HISTORY:  No pertinent family history of infectious/immunologic conditions in first degree relatives      ______________________________________________________  Allergies    No Known Allergies    Intolerances    ______________________________________________________  MEDICATIONS:  Antibiotics:  cefTRIAXone Injectable. 1000 milliGRAM(s) IV Push every 24 hours    Other medications:  aspirin  chewable 81 milliGRAM(s) Oral daily  bacitracin   Ointment 1 Application(s) Topical daily  enoxaparin Injectable 40 milliGRAM(s) SubCutaneous every 24 hours  ferrous    sulfate 325 milliGRAM(s) Oral daily    ______________________________________________________  REVIEW OF SYSTEMS:  CONSTITUTIONAL:  as per HPI   HEENT:  No diplopia or blurred vision.  No earache, sore throat or runny nose.  CARDIOVASCULAR:  No chest pain  RESPIRATORY:  No cough, shortness of breath  GASTROINTESTINAL:  No nausea, vomiting, abdominal pain or diarrhea.  GENITOURINARY:  as per HPI   MUSCULOSKELETAL:  no joint aches, no muscle pain  SKIN:  No change in skin, hair or nails.  NEUROLOGIC:  No headaches, seizures  PSYCHIATRIC:  No disorder of thought or mood.  ENDOCRINE:  No heat or cold intolerance  HEMATOLOGICAL:  No easy bruising or bleeding.     _____________________________________________________  PHYSICAL EXAM:  GEN: AAOx4, in NAD  HEENT: normocephalic and atraumatic. EOMI. PERRL.  Anicteric sclerae. Moist mucous membranes. No mucosal lesions. No nasal discharge.   NECK: Supple. No palpable neck masses or LN  LUNGS: eupneic, CTA B/L, no adventitious sounds  HEART: RRR, no m/r/g  ABDOMEN: Soft, NT, ND, no HSM, no palpable masses.  +BS.    : No CVA tenderness, no Benavidez catheter  EXTREMITIES: well perfused, without  edema.  MSK: No joint deformity or swelling  LYMPH: no palpable cervical, supraclavicular lymph nodes  NEUROLOGIC: Grossly no focal deficits   PSYCHIATRIC: Appropriate affect and mood.  SKIN: No rash, wounds or jaundice  LINES: no central lines, only peripheral access c/d/i    ______________________________________________________      Vitals:  T(F): 98.1 (19 Mar 2023 11:24), Max: 98.7 (18 Mar 2023 17:53)  HR: 64 (19 Mar 2023 11:24)  BP: 113/76 (19 Mar 2023 11:24)  RR: 18 (19 Mar 2023 11:24)  SpO2: 97% (19 Mar 2023 11:24) (97% - 99%)  temp max in last 48H T(F): , Max: 99.8 (03-17-23 @ 15:48)    Current Antibiotics:  cefTRIAXone Injectable. 1000 milliGRAM(s) IV Push every 24 hours    Other medications:  aspirin  chewable 81 milliGRAM(s) Oral daily  bacitracin   Ointment 1 Application(s) Topical daily  enoxaparin Injectable 40 milliGRAM(s) SubCutaneous every 24 hours  ferrous    sulfate 325 milliGRAM(s) Oral daily                            10.9   8.59  )-----------( 239      ( 19 Mar 2023 07:07 )             36.3     03-19    140  |  104  |  23.1<H>  ----------------------------<  91  4.9   |  25.0  |  1.21    Ca    8.5      19 Mar 2023 07:07  Phos  2.8     03-19  Mg     1.7     03-19    TPro  7.0  /  Alb  3.8  /  TBili  0.4  /  DBili  x   /  AST  26  /  ALT  19  /  AlkPhos  56  03-18    RECENT CULTURES:  03-17 @ 09:35 .Blood Blood-Peripheral Blood Culture PCR  Escherichia coli    Culture - Blood (03.17.23 @ 09:35)    -  Escherichia coli: Detec      -  Amikacin: S <=16    -  Ampicillin: R >16     -  Ampicillin/Sulbactam: I 16/8     -  Aztreonam: S <=4    -  Cefazolin: S <=2     -  Cefepime: S <=2    -  Cefoxitin: S <=8    -  Ceftriaxone: S <=1     -  Ciprofloxacin: S <=0.25    -  Ertapenem: S <=0.5    -  Gentamicin: S <=2    -  Imipenem: S <=1    -  Levofloxacin: S <=0.5    -  Meropenem: S <=1    -  Piperacillin/Tazobactam: S <=8    -  Tobramycin: S <=2    -  Trimethoprim/Sulfamethoxazole: R >2/38      Growth in anaerobic bottle: Gram Negative Rods  Growth in aerobic bottle: Gram Negative Rods      03-17 @ 09:30 .Blood Blood-Peripheral     Growth in aerobic bottle: Escherichia coli  See previous culture [id number]  Growth in anaerobic bottle: Gram Negative Rods    Growth in aerobic bottle: Gram Negative Rods  Growth in anaerobic bottle: Gram Negative Rods      WBC Count: 8.59 K/uL (03-19-23 @ 07:07)  WBC Count: 9.70 K/uL (03-18-23 @ 10:55)  WBC Count: 6.87 K/uL (03-17-23 @ 09:35)    Creatinine, Serum: 1.21 mg/dL (03-19-23 @ 07:07)  Creatinine, Serum: 1.07 mg/dL (03-18-23 @ 10:55)  Creatinine, Serum: 1.22 mg/dL (03-17-23 @ 09:35)       COVID-19 PCR: NotDetec (03-18-23 @ 10:55)  COVID-19 PCR: NotDetec (03-17-23 @ 09:35)    ______________________________________________________  RADIOLOGY  < from: CT Abdomen and Pelvis No Cont (03.19.23 @ 07:25) >  FINDINGS:  Evaluation of solid organs and vascular structures is limited without   intravenous contrast.    CHEST:  Exam is degraded by motion artifact.    LUNGS AND LARGE AIRWAYS: Patent central airways. Clear lungs.  PLEURA: No pleural effusion.  VESSELS: Atherosclerotic changes of the aorta and coronary arteries.  HEART: Heart size is normal. No pericardial effusion.  MEDIASTINUM AND JOSLYN: No lymphadenopathy.  CHEST WALL AND LOWER NECK: Trace bilateral gynecomastia.    ABDOMEN AND PELVIS:  LIVER: Within normal limits.  BILE DUCTS: Normal caliber.  GALLBLADDER: Within normal limits.  SPLEEN: Within normal limits.  PANCREAS: Within normal limits.  ADRENALS: Within normal limits.  KIDNEYS/URETERS: No renal stones. Question mild right hydronephrosis.   Right renal cyst. Multifocal left renal cortical scarring with atrophic   portion of the left lower pole.    BLADDER: Within normal limits.  REPRODUCTIVE ORGANS: Enlarged prostate.    BOWEL: Moderate to large hiatal hernia. No bowel obstruction. Appendix  is normal. Colonic diverticulosis.  PERITONEUM: No ascites.  VESSELS: Atherosclerotic changes.  RETROPERITONEUM/LYMPH NODES: No lymphadenopathy.  ABDOMINAL WALL: Fat-containing right inguinal hernia.  BONES: Degenerative changes.    IMPRESSION:  Exam limited by noncontrast technique and motion artifact. No definite   acute findings to explain the etiology of bacteremia. Question mild right   hydronephrosis.    < end of copied text >

## 2023-03-19 NOTE — CONSULT NOTE ADULT - ASSESSMENT
73 y/o M with HTN evaluated at [facility] Ed on 3/17 after sudden onset on rigors, diaphoresis and weakness while at work on 3/17; he was sent home and had fevers that night. Called back to the hospital on 3/18 after Bcx grew E. coli.     - Suspect source to be urinary tract despite entirely normal UA   - CT AP limited due to motion and lack of IV contrast - possible mild right hydronephrosis, right renal cyst and multifocal left renal cortical scarring with atrophy  - Blood isolate only resistant to TMP/SMX and amp  - Follow repeat BCX  - Follow UCx  - Recommend Renal US   - Continue ceftriaxone  - Anticipate discharge on oral abx  - No leukocytosis   - Currently afebrile     d/w Dr. Heath   d/w patient and family at bedside

## 2023-03-19 NOTE — CHART NOTE - NSCHARTNOTEFT_GEN_A_CORE
Notified by RN pt s/p witnessed fall. Per RN pt was witnessed getting out of bed and ambulating to the restroom when pt tripped and fell on the floor landing on his right leg, immediately stood up on his own and continued walking to the restroom. RN reports pt did not hit his head or had LOC. RN stated she has witnessed patient ambulating to the restroom overnight without difficulty prior to this event.  Patient seen sleeping in bed, NAD. Pt Beninese speaking, RN translated at bedside. Patient A&O x3 reports he tripped over his boot on his left foot while walking and fell on the floor. Pt stated he landed on his right hip/buttocks, reports mild aching sensation to right hip but feels "fine." Patient denies dizziness, lightheadedness, CP, SOB, abd pain or discomfort at the time of even and exam. Pt denies hitting his head, LOC.  PHYSICAL EXAM-  VS:   T(F): 98.5 oral  HR: 71   BP: 152/84   RR: 18   SpO2: 99%   Patient On (Oxygen Delivery Method): room air    GENERAL: calm, NAD  HEAD:  Atraumatic, Normocephalic  EYES: EOMI, PERRLA, conjunctiva and sclera clear  CHEST/LUNG: CTA bilaterally. No wheezing, rhonchi, rales  HEART: +S1/S2. Regular rate and rhythm.  ABDOMEN: Soft, Nontender, Nondistended. Bowel sounds present x4 quads  EXTREMITIES:  mild tenderness upon palpation to right hip, no ecchymosis, no erythema. Skin is grossly intact. Left foot with dressing and medical walking shoe in place. 2+ Peripheral Pulses, No edema.  NERVOUS SYSTEM:  Alert & Oriented X3. Motor Strength 5/5 B/L upper and lower extremities. FORM x4 extremities.    - Patient reports mild aching sensation to right hip but denies pain requiring analgesic. +FROM to all extremities. No visible signs of new injury. Will continue to monitor at this time. Pt instructed to notify RN if pain worsens. If develops increase in pain or decrease in ROM will require imaging and further evaluation.   - RN instructed to continue to monitor pt and notify provider of any c/o pain or changes in pt status. Notified by RN pt s/p witnessed fall. Per RN pt was witnessed getting out of bed and ambulating to the restroom when pt tripped and fell on the floor landing on his right leg, immediately stood up on his own and continued walking to the restroom without difficulty. RN reports pt did not hit his head or had LOC. RN stated she has witnessed patient ambulating to the restroom overnight without difficulty prior to this event.  Patient seen sleeping in bed, NAD. Pt Gambian speaking, RN translated at bedside. Patient A&O x3 reports he tripped over his boot on his left foot while walking and fell on the floor. Pt stated he landed on his right hip/buttocks, reports mild aching sensation to right hip but feels "fine." Patient denies dizziness, lightheadedness, CP, SOB, abd pain or discomfort at the time of even and exam. Pt denies hitting his head, LOC.  PHYSICAL EXAM-  VS:   T(F): 98.5 oral  HR: 71   BP: 152/84   RR: 18   SpO2: 99%   Patient On (Oxygen Delivery Method): room air    GENERAL: calm, NAD  HEAD:  Atraumatic, Normocephalic  EYES: EOMI, PERRLA, conjunctiva and sclera clear  CHEST/LUNG: CTA bilaterally. No wheezing, rhonchi, rales  HEART: +S1/S2. Regular rate and rhythm.  ABDOMEN: Soft, Nontender, Nondistended. Bowel sounds present x4 quads  EXTREMITIES:  mild tenderness upon palpation to right hip. No ecchymosis, no erythema to right hip/Lower extremity. Skin is grossly intact. Left foot with dressing and medical walking shoe in place. 2+ Peripheral Pulses, No edema.  NERVOUS SYSTEM:  Alert & Oriented X3. Motor Strength 5/5 B/L upper and lower extremities. FROM x4 extremities.    - Patient s/p witness fall, reports mild aching sensation to right hip but denies pain requiring analgesic. +FROM to all extremities. No visible signs of new injury. Will continue to monitor at this time. Pt instructed to notify RN if pain worsens. If develops increase in pain or decrease in ROM will require imaging and further evaluation.   - RN instructed to continue to monitor pt and notify provider of any c/o pain or changes in pt status.
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
## 2023-03-20 ENCOUNTER — TRANSCRIPTION ENCOUNTER (OUTPATIENT)
Age: 73
End: 2023-03-20

## 2023-03-20 VITALS
RESPIRATION RATE: 18 BRPM | HEART RATE: 63 BPM | OXYGEN SATURATION: 95 % | DIASTOLIC BLOOD PRESSURE: 73 MMHG | SYSTOLIC BLOOD PRESSURE: 109 MMHG | TEMPERATURE: 98 F

## 2023-03-20 LAB
-  AMIKACIN: SIGNIFICANT CHANGE UP
-  AMOXICILLIN/CLAVULANIC ACID: SIGNIFICANT CHANGE UP
-  AMPICILLIN/SULBACTAM: SIGNIFICANT CHANGE UP
-  AMPICILLIN: SIGNIFICANT CHANGE UP
-  AZTREONAM: SIGNIFICANT CHANGE UP
-  CEFAZOLIN: SIGNIFICANT CHANGE UP
-  CEFEPIME: SIGNIFICANT CHANGE UP
-  CEFOTAXIME: SIGNIFICANT CHANGE UP
-  CEFOXITIN: SIGNIFICANT CHANGE UP
-  CEFTAZIDIME: SIGNIFICANT CHANGE UP
-  CEFTRIAXONE: SIGNIFICANT CHANGE UP
-  CEFUROXIME: SIGNIFICANT CHANGE UP
-  CIPROFLOXACIN: SIGNIFICANT CHANGE UP
-  ERTAPENEM: SIGNIFICANT CHANGE UP
-  GENTAMICIN: SIGNIFICANT CHANGE UP
-  LEVOFLOXACIN: SIGNIFICANT CHANGE UP
-  MEROPENEM: SIGNIFICANT CHANGE UP
-  MINOCYCLINE: SIGNIFICANT CHANGE UP
-  PIPERACILLIN/TAZOBACTAM: SIGNIFICANT CHANGE UP
-  TIGECYCLINE: SIGNIFICANT CHANGE UP
-  TOBRAMYCIN: SIGNIFICANT CHANGE UP
-  TRIMETHOPRIM/SULFAMETHOXAZOLE: SIGNIFICANT CHANGE UP
ANION GAP SERPL CALC-SCNC: 9 MMOL/L — SIGNIFICANT CHANGE UP (ref 5–17)
BUN SERPL-MCNC: 20.1 MG/DL — HIGH (ref 8–20)
CALCIUM SERPL-MCNC: 8.6 MG/DL — SIGNIFICANT CHANGE UP (ref 8.4–10.5)
CHLORIDE SERPL-SCNC: 100 MMOL/L — SIGNIFICANT CHANGE UP (ref 96–108)
CO2 SERPL-SCNC: 29 MMOL/L — SIGNIFICANT CHANGE UP (ref 22–29)
CREAT SERPL-MCNC: 1.25 MG/DL — SIGNIFICANT CHANGE UP (ref 0.5–1.3)
CULTURE RESULTS: SIGNIFICANT CHANGE UP
EGFR: 62 ML/MIN/1.73M2 — SIGNIFICANT CHANGE UP
GLUCOSE SERPL-MCNC: 105 MG/DL — HIGH (ref 70–99)
HCT VFR BLD CALC: 39.8 % — SIGNIFICANT CHANGE UP (ref 39–50)
HGB BLD-MCNC: 12.3 G/DL — LOW (ref 13–17)
MCHC RBC-ENTMCNC: 23.3 PG — LOW (ref 27–34)
MCHC RBC-ENTMCNC: 30.9 GM/DL — LOW (ref 32–36)
MCV RBC AUTO: 75.4 FL — LOW (ref 80–100)
METHOD TYPE: SIGNIFICANT CHANGE UP
ORGANISM # SPEC MICROSCOPIC CNT: SIGNIFICANT CHANGE UP
ORGANISM # SPEC MICROSCOPIC CNT: SIGNIFICANT CHANGE UP
PLATELET # BLD AUTO: 286 K/UL — SIGNIFICANT CHANGE UP (ref 150–400)
POTASSIUM SERPL-MCNC: 4.4 MMOL/L — SIGNIFICANT CHANGE UP (ref 3.5–5.3)
POTASSIUM SERPL-SCNC: 4.4 MMOL/L — SIGNIFICANT CHANGE UP (ref 3.5–5.3)
RBC # BLD: 5.28 M/UL — SIGNIFICANT CHANGE UP (ref 4.2–5.8)
RBC # FLD: 16.9 % — HIGH (ref 10.3–14.5)
SODIUM SERPL-SCNC: 138 MMOL/L — SIGNIFICANT CHANGE UP (ref 135–145)
SPECIMEN SOURCE: SIGNIFICANT CHANGE UP
WBC # BLD: 9.03 K/UL — SIGNIFICANT CHANGE UP (ref 3.8–10.5)
WBC # FLD AUTO: 9.03 K/UL — SIGNIFICANT CHANGE UP (ref 3.8–10.5)

## 2023-03-20 PROCEDURE — 99232 SBSQ HOSP IP/OBS MODERATE 35: CPT

## 2023-03-20 PROCEDURE — 99239 HOSP IP/OBS DSCHRG MGMT >30: CPT

## 2023-03-20 RX ORDER — CEFPODOXIME PROXETIL 100 MG
1 TABLET ORAL
Qty: 8 | Refills: 0
Start: 2023-03-20 | End: 2023-03-23

## 2023-03-20 RX ORDER — FERROUS SULFATE 325(65) MG
1 TABLET ORAL
Qty: 30 | Refills: 0
Start: 2023-03-20 | End: 2023-04-18

## 2023-03-20 RX ADMIN — Medication 81 MILLIGRAM(S): at 11:38

## 2023-03-20 RX ADMIN — CEFTRIAXONE 1000 MILLIGRAM(S): 500 INJECTION, POWDER, FOR SOLUTION INTRAMUSCULAR; INTRAVENOUS at 11:38

## 2023-03-20 RX ADMIN — Medication 325 MILLIGRAM(S): at 11:38

## 2023-03-20 NOTE — DISCHARGE NOTE PROVIDER - ATTENDING DISCHARGE PHYSICAL EXAMINATION:
VITALS:   T(C): 36.6 (03-20-23 @ 07:50), Max: 36.9 (03-19-23 @ 19:12)  HR: 63 (03-20-23 @ 07:50) (63 - 70)  BP: 109/73 (03-20-23 @ 07:50) (104/69 - 119/80)  RR: 18 (03-20-23 @ 07:50) (18 - 18)  SpO2: 95% (03-20-23 @ 07:50) (95% - 98%)    GENERAL: NAD, lying in bed comfortably  HEAD:  Atraumatic, Normocephalic  EYES: EOMI, PERRLA, conjunctiva and sclera clear  ENT: Moist mucous membranes  NECK: Supple, No JVD  CHEST/LUNG: Clear to auscultation bilaterally; No rales, rhonchi, wheezing, or rubs. Unlabored respirations  HEART: Regular rate and rhythm; No murmurs, rubs, or gallops  ABDOMEN: BSx4; Soft, nontender, nondistended  EXTREMITIES:  2+ Peripheral Pulses, brisk capillary refill. No clubbing, cyanosis, or edema  NERVOUS SYSTEM:  A&Ox3, no focal deficits   SKIN: No rashes or lesions  PSYCH: Normal affect, euthymic mood

## 2023-03-20 NOTE — DISCHARGE NOTE NURSING/CASE MANAGEMENT/SOCIAL WORK - PATIENT PORTAL LINK FT
You can access the FollowMyHealth Patient Portal offered by [facility] by registering at the following website: [email or web address]. By joining [employer]’s FollowMyHealth portal, you will also be able to view your health information using other applications (apps) compatible with our system.

## 2023-03-20 NOTE — DISCHARGE NOTE PROVIDER - NSDCMRMEDTOKEN_GEN_ALL_CORE_FT
aspirin 81 mg oral tablet, chewable: 1 tab(s) orally once a day  cefpodoxime 200 mg oral tablet: 1 tab(s) orally 2 times a day   ferrous sulfate 325 mg (65 mg elemental iron) oral tablet: 1 tab(s) orally once a day  ibuprofen:   lisinopril-hydrochlorothiazide 10 mg-12.5 mg oral tablet: 1 tab(s) orally once a day

## 2023-03-20 NOTE — DISCHARGE NOTE NURSING/CASE MANAGEMENT/SOCIAL WORK - NSDCPEFALRISK_GEN_ALL_CORE
For information on Fall & Injury Prevention, visit: [email or web address] OR  [email or web address] OR  https://www.cdc.gov/steadi/patient.html

## 2023-03-20 NOTE — PROGRESS NOTE ADULT - ASSESSMENT
73 y/o M with HTN evaluated at [facility] on 3/17 after sudden onset on rigors, diaphoresis and weakness while at work on 3/17; he was sent home and had fevers that night. Called back to the hospital on 3/18 after Bcx grew E. coli.     - Suspect source to be urinary tract despite entirely normal UA   - CT AP limited due to motion and lack of IV contrast - possible mild right hydronephrosis, right renal cyst and multifocal left renal cortical scarring with atrophy  - Renal US demonstrating right renal cyst and left lower pole kidney atrophy   - Blood isolate only resistant to TMP/SMX and amp  - Repeat BCX NGTD   - UCx neg   - On CTX  - OK to discharge on oral cefpodoxime 200 mg PO q12h through 3/25  - No leukocytosis   - Currently afebrile     d/w Dr. Heath

## 2023-03-20 NOTE — DISCHARGE NOTE PROVIDER - NSDCCPCAREPLAN_GEN_ALL_CORE_FT
PRINCIPAL DISCHARGE DIAGNOSIS  Diagnosis: Bacteremia  Assessment and Plan of Treatment: continue your antibiotics as instructed.
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
## 2023-03-20 NOTE — PROGRESS NOTE ADULT - SUBJECTIVE AND OBJECTIVE BOX
Yashira Heath M.D.    Patient is a 72y old  Male who presents with a chief complaint of Bacteremia (18 Mar 2023 14:57)      SUBJECTIVE / OVERNIGHT EVENTS: s/p witnessed fall overnight.     Patient denies chest pain, SOB, abd pain, N/V, fever, chills, dysuria or any other complaints. All remainder ROS negative.     MEDICATIONS  (STANDING):  aspirin  chewable 81 milliGRAM(s) Oral daily  bacitracin   Ointment 1 Application(s) Topical daily  cefTRIAXone Injectable. 1000 milliGRAM(s) IV Push every 24 hours  enoxaparin Injectable 40 milliGRAM(s) SubCutaneous every 24 hours  ferrous    sulfate 325 milliGRAM(s) Oral daily    MEDICATIONS  (PRN):  acetaminophen     Tablet .. 650 milliGRAM(s) Oral every 6 hours PRN Temp greater or equal to 38C (100.4F), Mild Pain (1 - 3)  aluminum hydroxide/magnesium hydroxide/simethicone Suspension 30 milliLiter(s) Oral every 4 hours PRN Dyspepsia  melatonin 3 milliGRAM(s) Oral at bedtime PRN Insomnia  ondansetron Injectable 4 milliGRAM(s) IV Push every 8 hours PRN Nausea and/or Vomiting      I&O's Summary      PHYSICAL EXAM:  Vital Signs Last 24 Hrs  T(C): 36.7 (19 Mar 2023 11:24), Max: 37.1 (18 Mar 2023 17:53)  T(F): 98.1 (19 Mar 2023 11:24), Max: 98.7 (18 Mar 2023 17:53)  HR: 64 (19 Mar 2023 11:24) (62 - 71)  BP: 113/76 (19 Mar 2023 11:24) (103/67 - 152/84)  BP(mean): --  RR: 18 (19 Mar 2023 11:24) (18 - 19)  SpO2: 97% (19 Mar 2023 11:24) (97% - 99%)    Parameters below as of 19 Mar 2023 11:24  Patient On (Oxygen Delivery Method): room air        CONSTITUTIONAL: NAD, well-groomed  ENMT: Moist oral mucosa, no pharyngeal injection or exudates; normal dentition  RESPIRATORY: Normal respiratory effort; lungs are clear to auscultation bilaterally  CARDIOVASCULAR: Regular rate and rhythm, normal S1 and S2, no murmur/rub/gallop; No lower extremity edema; Peripheral pulses are 2+ bilaterally  ABDOMEN: Nontender to palpation, normoactive bowel sounds, no rebound/guarding; No hepatosplenomegaly  MUSCLOSKELETAL:  Normal gait; no clubbing or cyanosis of digits; no joint swelling or tenderness to palpation  PSYCH: A+O x3; affect appropriate  NEUROLOGY: CN 2-12 are intact and symmetric; no gross sensory deficits;   SKIN: No rashes; no palpable lesions    LABS:                        10.9   8.59  )-----------( 239      ( 19 Mar 2023 07:07 )             36.3     03-19    140  |  104  |  23.1<H>  ----------------------------<  91  4.9   |  25.0  |  1.21    Ca    8.5      19 Mar 2023 07:07  Phos  2.8     03-19  Mg     1.7     03-19    TPro  7.0  /  Alb  3.8  /  TBili  0.4  /  DBili  x   /  AST  26  /  ALT  19  /  AlkPhos  56  03-18    PT/INR - ( 18 Mar 2023 10:55 )   PT: 12.9 sec;   INR: 1.11 ratio         PTT - ( 18 Mar 2023 10:55 )  PTT:26.2 sec  CARDIAC MARKERS ( 18 Mar 2023 10:55 )  x     / <0.01 ng/mL / x     / x     / x          Urinalysis Basic - ( 18 Mar 2023 12:46 )    Color: Yellow / Appearance: Clear / S.010 / pH: x  Gluc: x / Ketone: Trace  / Bili: Negative / Urobili: Negative mg/dL   Blood: x / Protein: Negative / Nitrite: Negative   Leuk Esterase: Negative / RBC: 0-2 /HPF / WBC Negative /HPF   Sq Epi: x / Non Sq Epi: Occasional / Bacteria: Occasional        Culture - Blood (collected 17 Mar 2023 09:35)  Source: .Blood Blood-Peripheral  Gram Stain (18 Mar 2023 04:49):    Growth in anaerobic bottle: Gram Negative Rods    Growth in aerobic bottle: Gram Negative Rods  Preliminary Report (18 Mar 2023 17:16):    Growth in aerobic and anaerobic bottles: Escherichia coli    ***Blood Panel PCR results on this specimen are available    approximately 3 hours after the Gram stain result.***    Gram stain, PCR, and/or culture results may not always    correspond due to difference in methodologies.    ************************************************************    This PCR assay was performed by multiplex PCR. This    Assay tests for 66 bacterial and resistance gene targets.    Please refer to the [facility] test directory    at [email or web address] for details.  Organism: Blood Culture PCR (18 Mar 2023 05:48)  Organism: Blood Culture PCR (18 Mar 2023 05:48)    Culture - Blood (collected 17 Mar 2023 09:30)  Source: .Blood Blood-Peripheral  Gram Stain (18 Mar 2023 09:34):    Growth in aerobic bottle: Gram Negative Rods    Growth in anaerobic bottle: Gram Negative Rods  Preliminary Report (18 Mar 2023 17:17):    Growth in aerobic bottle: Escherichia coli    See previous culture [id number]    Growth in anaerobic bottle: Gram Negative Rods      CAPILLARY BLOOD GLUCOSE          RADIOLOGY & ADDITIONAL TESTS:  Results Reviewed:   Imaging Personally Reviewed:  Electrocardiogram Personally Reviewed:
[facility]  INFECTIOUS DISEASES at [place] and [place]  ===============================================================                               Reno Gilman MD*     Dedra Dumas MD*                         Curtis Diaz MD*       Marichuy Rushing MD*            Diplomates American Board of Internal Medicine & Infectious Diseases                * [place] Office - Appt - Tel  [phone] Fax [phone]                * [place] Office - Appt - Tel [phone] Fax [phone]                                  Hospital Consult line:  [phone]  ==============================================================    SALMA NAVAS [id number]    Follow up: E. coli bacteremia    No acute events  Afebrile, hemodynamically stable     I have personally reviewed the labs and data; pertinent labs and data are listed in this note; please see below.     _______________________________________________________________  REVIEW OF SYSTEMS  Patient offers no complaints. Denies abd pain, N, V, D or urinary symptoms.   ________________________________________________________________  Allergies:  No Known Allergies    ________________________________________________________________  PHYSICAL EXAM  GEN: in NAD, lying in bed.   HEENT: Anicteric sclerae. Moist mucous membranes. No mucosal lesions.   LUNGS: eupneic. CTA B/L.  HEART: RRR, no m/r/g  ABDOMEN: Soft, NT, ND,  +BS.    : No CVA tenderness. No Benavidez catheter  NEUROLOGIC: Grossly no focal deficits   PSYCHIATRIC: Appropriate affect and mood  LINES: PIV  ________________________________________________________________  Vitals:  T(F): 97.9 (20 Mar 2023 07:50), Max: 98.4 (19 Mar 2023 19:12)  HR: 63 (20 Mar 2023 07:50)  BP: 109/73 (20 Mar 2023 07:50)  RR: 18 (20 Mar 2023 07:50)  SpO2: 95% (20 Mar 2023 07:50) (95% - 98%)  temp max in last 48H T(F): , Max: 98.7 (03-18-23 @ 17:53)    Current Antibiotics:  cefTRIAXone Injectable. 1000 milliGRAM(s) IV Push every 24 hours    Other medications:  aspirin  chewable 81 milliGRAM(s) Oral daily  bacitracin   Ointment 1 Application(s) Topical daily  enoxaparin Injectable 40 milliGRAM(s) SubCutaneous every 24 hours  ferrous    sulfate 325 milliGRAM(s) Oral daily                            12.3   9.03  )-----------( 286      ( 20 Mar 2023 07:00 )             39.8     03-20    138  |  100  |  20.1<H>  ----------------------------<  105<H>  4.4   |  29.0  |  1.25    Ca    8.6      20 Mar 2023 07:00  Phos  2.8     03-19  Mg     1.7     03-19      RECENT CULTURES:  03-18 @ 12:45 Clean Catch Clean Catch (Midstream)     No growth      03-18 @ 10:45 .Blood Blood-Peripheral     No growth to date.      03-18 @ 10:40 .Blood Blood-Peripheral     No growth to date.      Culture - Blood (03.17.23 @ 09:35)    -  Escherichia coli: Detec      -  Amikacin: S <=16    -  Ampicillin: R >16     -  Ampicillin/Sulbactam: I 16/8     -  Aztreonam: S <=4    -  Cefazolin: S <=2     -  Cefepime: S <=2    -  Cefoxitin: S <=8    -  Ceftriaxone: S <=1     -  Ciprofloxacin: S <=0.25    -  Ertapenem: S <=0.5    -  Gentamicin: S <=2    -  Imipenem: S <=1    -  Levofloxacin: S <=0.5    -  Meropenem: S <=1    -  Piperacillin/Tazobactam: S <=8    -  Tobramycin: S <=2    -  Trimethoprim/Sulfamethoxazole: R >2/38      03-17 @ 09:30 .Blood Blood-Peripheral     Growth in aerobic bottle: Escherichia coli  See previous culture [id number]      WBC Count: 9.03 K/uL (03-20-23 @ 07:00)  WBC Count: 8.59 K/uL (03-19-23 @ 07:07)  WBC Count: 9.70 K/uL (03-18-23 @ 10:55)  WBC Count: 6.87 K/uL (03-17-23 @ 09:35)    Creatinine, Serum: 1.25 mg/dL (03-20-23 @ 07:00)  Creatinine, Serum: 1.21 mg/dL (03-19-23 @ 07:07)  Creatinine, Serum: 1.07 mg/dL (03-18-23 @ 10:55)  Creatinine, Serum: 1.22 mg/dL (03-17-23 @ 09:35)       COVID-19 PCR: NotDetec (03-18-23 @ 10:55)  COVID-19 PCR: NotDetec (03-17-23 @ 09:35)    ________________________________________________________________  RADIOLOGY  < from: US Renal (03.19.23 @ 16:12) >  FINDINGS:  Right kidney: 11.8 cm. No renal mass, hydronephrosis or calculi. 4.9 cm   parapelvic and 1.6 cm interpolar cysts.    Left kidney: 11.6 cm. No renal mass, hydronephrosis or calculi. Lower   pole parenchymal atrophy as seen on same day CT scan    Urinary bladder: No mass, stone or wall thickening.    IMPRESSION:  Left lower pole renal atrophy.  Otherwise unremarkable study.    < end of copied text >    < from: CT Abdomen and Pelvis No Cont (03.19.23 @ 07:25) >  FINDINGS:  Evaluation of solid organs and vascular structures is limited without   intravenous contrast.    CHEST:  Exam is degraded by motion artifact.    LUNGS AND LARGE AIRWAYS: Patent central airways. Clear lungs.  PLEURA: No pleural effusion.  VESSELS: Atherosclerotic changes of the aorta and coronary arteries.  HEART: Heart size is normal. No pericardial effusion.  MEDIASTINUM AND JOSLYN: No lymphadenopathy.  CHEST WALL AND LOWER NECK: Trace bilateral gynecomastia.    ABDOMEN AND PELVIS:  LIVER: Within normal limits.  BILE DUCTS: Normal caliber.  GALLBLADDER: Within normal limits.  SPLEEN: Within normal limits.  PANCREAS: Within normal limits.  ADRENALS: Within normal limits.  KIDNEYS/URETERS: No renal stones. Question mild right hydronephrosis.   Right renal cyst. Multifocal left renal cortical scarring with atrophic   portion of the left lower pole.    BLADDER: Within normal limits.  REPRODUCTIVE ORGANS: Enlarged prostate.    BOWEL: Moderate to large hiatal hernia. No bowel obstruction. Appendix  is normal. Colonic diverticulosis.  PERITONEUM: No ascites.  VESSELS: Atherosclerotic changes.  RETROPERITONEUM/LYMPH NODES: No lymphadenopathy.  ABDOMINAL WALL: Fat-containing right inguinal hernia.  BONES: Degenerative changes.    IMPRESSION:  Exam limited by noncontrast technique and motion artifact. No definite   acute findings to explain the etiology of bacteremia. Question mild right   hydronephrosis.    < end of copied text >

## 2023-03-23 LAB
CULTURE RESULTS: SIGNIFICANT CHANGE UP
CULTURE RESULTS: SIGNIFICANT CHANGE UP
SPECIMEN SOURCE: SIGNIFICANT CHANGE UP
SPECIMEN SOURCE: SIGNIFICANT CHANGE UP

## 2023-07-20 PROCEDURE — 85027 COMPLETE CBC AUTOMATED: CPT

## 2023-07-20 PROCEDURE — 83605 ASSAY OF LACTIC ACID: CPT

## 2023-07-20 PROCEDURE — 80048 BASIC METABOLIC PNL TOTAL CA: CPT

## 2023-07-20 PROCEDURE — 83540 ASSAY OF IRON: CPT

## 2023-07-20 PROCEDURE — 93005 ELECTROCARDIOGRAM TRACING: CPT

## 2023-07-20 PROCEDURE — U0005: CPT

## 2023-07-20 PROCEDURE — 83550 IRON BINDING TEST: CPT

## 2023-07-20 PROCEDURE — 84466 ASSAY OF TRANSFERRIN: CPT

## 2023-07-20 PROCEDURE — 96374 THER/PROPH/DIAG INJ IV PUSH: CPT

## 2023-07-20 PROCEDURE — 82607 VITAMIN B-12: CPT

## 2023-07-20 PROCEDURE — 80053 COMPREHEN METABOLIC PANEL: CPT

## 2023-07-20 PROCEDURE — 85610 PROTHROMBIN TIME: CPT

## 2023-07-20 PROCEDURE — 87040 BLOOD CULTURE FOR BACTERIA: CPT

## 2023-07-20 PROCEDURE — 76775 US EXAM ABDO BACK WALL LIM: CPT

## 2023-07-20 PROCEDURE — 85025 COMPLETE CBC W/AUTO DIFF WBC: CPT

## 2023-07-20 PROCEDURE — 81001 URINALYSIS AUTO W/SCOPE: CPT

## 2023-07-20 PROCEDURE — 99285 EMERGENCY DEPT VISIT HI MDM: CPT | Mod: 25

## 2023-07-20 PROCEDURE — 84100 ASSAY OF PHOSPHORUS: CPT

## 2023-07-20 PROCEDURE — 71250 CT THORAX DX C-: CPT | Mod: MA

## 2023-07-20 PROCEDURE — 84484 ASSAY OF TROPONIN QUANT: CPT

## 2023-07-20 PROCEDURE — 73552 X-RAY EXAM OF FEMUR 2/>: CPT

## 2023-07-20 PROCEDURE — 73502 X-RAY EXAM HIP UNI 2-3 VIEWS: CPT

## 2023-07-20 PROCEDURE — 74176 CT ABD & PELVIS W/O CONTRAST: CPT | Mod: MA

## 2023-07-20 PROCEDURE — 36415 COLL VENOUS BLD VENIPUNCTURE: CPT

## 2023-07-20 PROCEDURE — 83735 ASSAY OF MAGNESIUM: CPT

## 2023-07-20 PROCEDURE — 82728 ASSAY OF FERRITIN: CPT

## 2023-07-20 PROCEDURE — 82746 ASSAY OF FOLIC ACID SERUM: CPT

## 2023-07-20 PROCEDURE — 86803 HEPATITIS C AB TEST: CPT

## 2023-07-20 PROCEDURE — 85730 THROMBOPLASTIN TIME PARTIAL: CPT

## 2023-07-20 PROCEDURE — 87086 URINE CULTURE/COLONY COUNT: CPT

## 2023-07-20 PROCEDURE — U0003: CPT

## 2023-07-20 PROCEDURE — 71045 X-RAY EXAM CHEST 1 VIEW: CPT

## 2024-03-24 ENCOUNTER — EMERGENCY (EMERGENCY)
Facility: HOSPITAL | Age: 74
LOS: 1 days | Discharge: LEFT BEFORE DISCHARGE | End: 2024-03-24
Attending: EMERGENCY MEDICINE
Payer: COMMERCIAL

## 2024-03-24 VITALS
RESPIRATION RATE: 18 BRPM | SYSTOLIC BLOOD PRESSURE: 170 MMHG | DIASTOLIC BLOOD PRESSURE: 110 MMHG | HEART RATE: 89 BPM | OXYGEN SATURATION: 96 % | TEMPERATURE: 98 F

## 2024-03-24 PROCEDURE — 93005 ELECTROCARDIOGRAM TRACING: CPT

## 2024-03-24 PROCEDURE — 99284 EMERGENCY DEPT VISIT MOD MDM: CPT | Mod: 25

## 2024-03-24 PROCEDURE — 71045 X-RAY EXAM CHEST 1 VIEW: CPT

## 2024-03-24 PROCEDURE — 93010 ELECTROCARDIOGRAM REPORT: CPT

## 2024-03-24 PROCEDURE — 72170 X-RAY EXAM OF PELVIS: CPT

## 2024-03-24 PROCEDURE — 99284 EMERGENCY DEPT VISIT MOD MDM: CPT

## 2024-03-24 PROCEDURE — 72170 X-RAY EXAM OF PELVIS: CPT | Mod: 26

## 2024-03-24 PROCEDURE — 71045 X-RAY EXAM CHEST 1 VIEW: CPT | Mod: 26

## 2024-03-24 PROCEDURE — 99053 MED SERV 10PM-8AM 24 HR FAC: CPT

## 2024-03-24 PROCEDURE — T1013: CPT

## 2024-03-24 RX ORDER — ACETAMINOPHEN 500 MG
1000 TABLET ORAL ONCE
Refills: 0 | Status: DISCONTINUED | OUTPATIENT
Start: 2024-03-24 | End: 2024-03-31
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
## 2024-03-24 NOTE — ED PROVIDER NOTE - NS ED ROS FT
Constitutional: (-) fever  (-)chills  (-)sweats  Eyes/ENT: (-)   Cardiovascular: +) chest pain, (-) palpitations (-) edema   Respiratory: (-) cough, (-) shortness of breath   Gastrointestinal: (-)nausea  (-)vomiting, (-) diarrhea  +) abdominal pain   :  (-)dysuria, (-)frequency, (-)urgency, (-)hematuria  Musculoskeletal: (-) neck pain, (-) back pain, (+ joint pain  Integumentary: (-) rash, (-) edema  Neurological: (-) headache, (-) altered mental status  (-)LOC
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
## 2024-03-24 NOTE — ED PROVIDER NOTE - PHYSICAL EXAMINATION
Gen: Alert, NAD  Head: NC, AT, PERRL, EOMI, normal lids/conjunctiva  Neck: +supple, no tenderness/meningismus/JVD, +Trachea midline  Pulm: Bilateral BS, normal resp effort, no wheeze/stridor/retractions  CV: RRR, no M/R/G, +dist pulses  CHEST WALL: ttp @ right costochondral border, no crepitus, contusion to right chest wall.   Abd: soft, NT/ND, +BS, no hepatosplenomegaly  Mskel:    L UE: from/nt @shoulder/elbow/wrist/hand   R UE: from/nt @shoulder/elbow/wrist/hand   L LE: from/nt @ hip/knee/ankle   R LE: from/nt @hip/knee/ankle   distal pulses intact  BACK: nt midline c/t/l/s spine  Neuro: AAOx3, no sensory/motor deficit

## 2024-03-24 NOTE — ED PROVIDER NOTE - PATIENT PORTAL LINK FT
You can access the FollowMyHealth Patient Portal offered by [facility] by registering at the following website: [email or web address]. By joining [employer]’s FollowMyHealth portal, you will also be able to view your health information using other applications (apps) compatible with our system.
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
## 2024-03-24 NOTE — ED PROVIDER NOTE - PROGRESS NOTE DETAILS
The patient has decided to leave against medical advice.  We discussed all risks, benefits, and alternatives to the progression of treatment and the potential dangers of leaving including but not limited to permanent disability, injury, and death.  The patient was instructed that they are welcome to change their decision to leave against medical advice and return to the emergency department at any time and for any reason in order to allow us to render care.   Raya used to speak with patient. RN called undersigned MD to bedside stating that family would like to leave AMA.  Discussed with family that if they left and wanted to go to another facility that they would be leaving AGAINST MEDICAL ADVICE.  Family wanted to discuss with another family member.   Raya used to discuss case with family and patient.  Will return to patient and family to discuss final decision regarding continuing with imaging/testing or leaving AGAINST MEDICAL ADVICE.

## 2024-09-17 NOTE — ED PROVIDER NOTE - CPE EDP NEURO NORM
Set up [facility]      Pediatric dermatology, please call  at [facility] for the dermatology clinic  Suspected Vitiligo    normal...

## 2025-03-24 ENCOUNTER — OFFICE (OUTPATIENT)
Dept: URBAN - METROPOLITAN AREA CLINIC 112 | Facility: CLINIC | Age: 75
Setting detail: OPHTHALMOLOGY
End: 2025-03-24
Payer: MEDICAID

## 2025-03-24 DIAGNOSIS — H35.033: ICD-10-CM

## 2025-03-24 DIAGNOSIS — H01.001: ICD-10-CM

## 2025-03-24 DIAGNOSIS — H01.004: ICD-10-CM

## 2025-03-24 DIAGNOSIS — H16.223: ICD-10-CM

## 2025-03-24 PROBLEM — H16.221 DRY EYE SYNDROME K SICCA; RIGHT EYE, LEFT EYE, BOTH EYES: Status: ACTIVE | Noted: 2025-03-24

## 2025-03-24 PROBLEM — H16.222 DRY EYE SYNDROME K SICCA; RIGHT EYE, LEFT EYE, BOTH EYES: Status: ACTIVE | Noted: 2025-03-24

## 2025-03-24 PROCEDURE — 92014 COMPRE OPH EXAM EST PT 1/>: CPT | Performed by: OPHTHALMOLOGY

## 2025-03-24 PROCEDURE — 92250 FUNDUS PHOTOGRAPHY W/I&R: CPT | Performed by: OPHTHALMOLOGY

## 2025-03-24 ASSESSMENT — TONOMETRY
OD_IOP_MMHG: 12
OS_IOP_MMHG: 13

## 2025-03-24 ASSESSMENT — LID EXAM ASSESSMENTS
OS_BLEPHARITIS: T
OD_BLEPHARITIS: T

## 2025-03-24 ASSESSMENT — SUPERFICIAL PUNCTATE KERATITIS (SPK)
OD_SPK: 1+
OS_SPK: T

## 2025-03-24 ASSESSMENT — CONFRONTATIONAL VISUAL FIELD TEST (CVF)
OS_FINDINGS: FULL
OD_FINDINGS: FULL

## 2025-04-02 ASSESSMENT — REFRACTION_MANIFEST
OS_ADD: +2.25
OD_SPHERE: PL
OD_AXIS: 100
OS_VA2: 20/20
OD_SPHERE: PLANO
OS_AXIS: 85
OD_VA1: 20/20
OD_ADD: +2.25
OD_VA2: 20/20
OD_ADD: +2.25
OD_CYLINDER: -0.50
OS_VA1: 20/20
OS_SPHERE: +2.00
OS_VA1: 20/20
OS_ADD: +2.25
OS_SPHERE: PLANO
OS_CYLINDER: -0.50
OD_VA1: 20/20

## 2025-04-02 ASSESSMENT — REFRACTION_AUTOREFRACTION
OS_CYLINDER: -0.50
OD_CYLINDER: -0.50
OD_AXIS: 088
OS_AXIS: 085
OS_SPHERE: +0.25
OD_SPHERE: -0.25

## 2025-04-02 ASSESSMENT — REFRACTION_CURRENTRX
OS_CYLINDER: -0.75
OS_VPRISM_DIRECTION: PROGS
OD_SPHERE: +1.50
OD_ADD: +2.75
OD_OVR_VA: 20/
OS_SPHERE: +2.50
OS_AXIS: 077
OD_VPRISM_DIRECTION: PROGS
OD_ADD: +2.00
OD_CYLINDER: -0.50
OS_OVR_VA: 20/
OD_OVR_VA: 20/
OD_AXIS: 092
OS_ADD: +3.25
OS_ADD: +2.00
OS_OVR_VA: 20/

## 2025-04-02 ASSESSMENT — KERATOMETRY
OD_AXISANGLE_DEGREES: 098
OD_K1POWER_DIOPTERS: 44.00
OS_K1POWER_DIOPTERS: 44.00
METHOD_AUTO_MANUAL: AUTO
OS_AXISANGLE_DEGREES: 072
OS_K2POWER_DIOPTERS: 44.25
OD_K2POWER_DIOPTERS: 44.25

## 2025-04-02 ASSESSMENT — VISUAL ACUITY
OD_BCVA: 20/20
OS_BCVA: 20/25-2

## 2025-04-28 ENCOUNTER — OFFICE (OUTPATIENT)
Dept: URBAN - METROPOLITAN AREA CLINIC 112 | Facility: CLINIC | Age: 75
Setting detail: OPHTHALMOLOGY
End: 2025-04-28
Payer: MEDICAID

## 2025-04-28 DIAGNOSIS — H01.004: ICD-10-CM

## 2025-04-28 DIAGNOSIS — H01.002: ICD-10-CM

## 2025-04-28 DIAGNOSIS — H16.223: ICD-10-CM

## 2025-04-28 DIAGNOSIS — H01.001: ICD-10-CM

## 2025-04-28 PROBLEM — H01.005 BLEPHARITIS; RIGHT UPPER LID, RIGHT LOWER LID, LEFT UPPER LID, LEFT LOWER LID: Status: ACTIVE | Noted: 2025-04-28

## 2025-04-28 PROCEDURE — 99213 OFFICE O/P EST LOW 20 MIN: CPT | Performed by: REGISTERED NURSE

## 2025-04-28 ASSESSMENT — REFRACTION_MANIFEST
OD_ADD: +2.25
OD_CYLINDER: -0.50
OD_SPHERE: PLANO
OS_SPHERE: +2.00
OS_VA1: 20/20
OD_AXIS: 100
OS_CYLINDER: -0.50
OD_VA1: 20/20
OS_VA1: 20/20
OD_SPHERE: PL
OS_ADD: +2.25
OS_ADD: +2.25
OD_VA2: 20/20
OS_AXIS: 85
OS_SPHERE: PLANO
OD_VA1: 20/20
OS_VA2: 20/20
OD_ADD: +2.25

## 2025-04-28 ASSESSMENT — REFRACTION_CURRENTRX
OS_ADD: +2.00
OS_AXIS: 077
OS_SPHERE: +2.50
OS_OVR_VA: 20/
OS_OVR_VA: 20/
OD_OVR_VA: 20/
OD_VPRISM_DIRECTION: PROGS
OD_ADD: +2.75
OD_ADD: +2.00
OS_VPRISM_DIRECTION: PROGS
OD_AXIS: 092
OD_SPHERE: +1.50
OD_OVR_VA: 20/
OD_CYLINDER: -0.50
OS_CYLINDER: -0.75
OS_ADD: +3.25

## 2025-04-28 ASSESSMENT — LID EXAM ASSESSMENTS
OD_BLEPHARITIS: T
OS_BLEPHARITIS: T
OS_COMMENTS: +FLAKING
OD_COMMENTS: +FLAKING

## 2025-04-28 ASSESSMENT — KERATOMETRY
OD_AXISANGLE_DEGREES: 090
OS_AXISANGLE_DEGREES: 090
OS_K1POWER_DIOPTERS: 44.25
OD_K2POWER_DIOPTERS: 44.50
METHOD_AUTO_MANUAL: AUTO
OD_K1POWER_DIOPTERS: 44.50
OS_K2POWER_DIOPTERS: 44.25

## 2025-04-28 ASSESSMENT — VISUAL ACUITY
OS_BCVA: 20/20-1
OD_BCVA: 20/20

## 2025-04-28 ASSESSMENT — CONFRONTATIONAL VISUAL FIELD TEST (CVF)
OS_FINDINGS: FULL
OD_FINDINGS: FULL

## 2025-04-28 ASSESSMENT — REFRACTION_AUTOREFRACTION
OD_CYLINDER: -0.50
OS_SPHERE: +0.25
OD_SPHERE: -0.25
OS_AXIS: 088
OD_AXIS: 086
OS_CYLINDER: -0.50

## 2025-04-28 ASSESSMENT — TONOMETRY
OS_IOP_MMHG: 14
OD_IOP_MMHG: 13

## 2025-04-28 ASSESSMENT — SUPERFICIAL PUNCTATE KERATITIS (SPK)
OD_SPK: 1+
OS_SPK: T

## 2025-07-03 ENCOUNTER — OFFICE (OUTPATIENT)
Dept: URBAN - METROPOLITAN AREA CLINIC 112 | Facility: CLINIC | Age: 75
Setting detail: OPHTHALMOLOGY
End: 2025-07-03
Payer: MEDICAID

## 2025-07-03 DIAGNOSIS — H16.223: ICD-10-CM

## 2025-07-03 DIAGNOSIS — H01.002: ICD-10-CM

## 2025-07-03 DIAGNOSIS — Z96.1: ICD-10-CM

## 2025-07-03 DIAGNOSIS — H10.45: ICD-10-CM

## 2025-07-03 DIAGNOSIS — H16.221: ICD-10-CM

## 2025-07-03 DIAGNOSIS — H01.004: ICD-10-CM

## 2025-07-03 DIAGNOSIS — H16.222: ICD-10-CM

## 2025-07-03 DIAGNOSIS — H01.001: ICD-10-CM

## 2025-07-03 DIAGNOSIS — H35.373: ICD-10-CM

## 2025-07-03 DIAGNOSIS — H35.033: ICD-10-CM

## 2025-07-03 DIAGNOSIS — H01.005: ICD-10-CM

## 2025-07-03 PROCEDURE — 92012 INTRM OPH EXAM EST PATIENT: CPT | Performed by: OPHTHALMOLOGY

## 2025-07-03 ASSESSMENT — REFRACTION_MANIFEST
OD_VA2: 20/20
OS_SPHERE: PLANO
OS_ADD: +2.25
OS_CYLINDER: -0.50
OS_ADD: +2.25
OD_CYLINDER: -0.50
OD_AXIS: 100
OD_SPHERE: PL
OD_VA1: 20/20
OD_ADD: +2.25
OS_SPHERE: +2.00
OS_VA2: 20/20
OS_VA1: 20/20
OD_SPHERE: PLANO
OD_ADD: +2.25
OD_VA1: 20/20
OS_AXIS: 85
OS_VA1: 20/20

## 2025-07-03 ASSESSMENT — VISUAL ACUITY
OS_BCVA: 20/20-1
OD_BCVA: 20/20

## 2025-07-03 ASSESSMENT — LID EXAM ASSESSMENTS
OS_COMMENTS: +FLAKING
OS_MEIBOMITIS: LLL LUL 2+
OD_BLEPHARITIS: T
OD_COMMENTS: +FLAKING
OS_BLEPHARITIS: T
OD_MEIBOMITIS: RLL RUL 2+

## 2025-07-03 ASSESSMENT — REFRACTION_AUTOREFRACTION
OD_AXIS: 087
OS_CYLINDER: -0.50
OD_SPHERE: 0.00
OD_CYLINDER: -0.75
OS_SPHERE: +0.50
OS_AXIS: 092

## 2025-07-03 ASSESSMENT — REFRACTION_CURRENTRX
OD_ADD: +2.00
OS_OVR_VA: 20/
OD_CYLINDER: -0.50
OD_VPRISM_DIRECTION: PROGS
OS_AXIS: 077
OS_CYLINDER: -0.75
OS_ADD: +3.25
OD_AXIS: 092
OD_OVR_VA: 20/
OS_ADD: +2.00
OD_SPHERE: +1.50
OD_OVR_VA: 20/
OS_VPRISM_DIRECTION: PROGS
OS_OVR_VA: 20/
OD_ADD: +2.75
OS_SPHERE: +2.50

## 2025-07-03 ASSESSMENT — TONOMETRY
OD_IOP_MMHG: 13
OS_IOP_MMHG: 13

## 2025-07-03 ASSESSMENT — KERATOMETRY
OD_AXISANGLE_DEGREES: 090
OS_AXISANGLE_DEGREES: 090
OS_K2POWER_DIOPTERS: 44.25
OD_K2POWER_DIOPTERS: 44.50
OS_K1POWER_DIOPTERS: 44.00
METHOD_AUTO_MANUAL: AUTO
OD_K1POWER_DIOPTERS: 44.50

## 2025-07-03 ASSESSMENT — SUPERFICIAL PUNCTATE KERATITIS (SPK)
OS_SPK: T
OD_SPK: 1+

## 2025-07-03 ASSESSMENT — CONFRONTATIONAL VISUAL FIELD TEST (CVF)
OD_FINDINGS: FULL
OS_FINDINGS: FULL

## 2025-07-10 ENCOUNTER — RX ONLY (RX ONLY)
Age: 75
End: 2025-07-10

## 2025-07-10 ENCOUNTER — OFFICE (OUTPATIENT)
Dept: URBAN - METROPOLITAN AREA CLINIC 112 | Facility: CLINIC | Age: 75
Setting detail: OPHTHALMOLOGY
End: 2025-07-10
Payer: MEDICAID

## 2025-07-10 DIAGNOSIS — H01.004: ICD-10-CM

## 2025-07-10 DIAGNOSIS — H43.393: ICD-10-CM

## 2025-07-10 DIAGNOSIS — H16.223: ICD-10-CM

## 2025-07-10 DIAGNOSIS — H01.001: ICD-10-CM

## 2025-07-10 DIAGNOSIS — H01.002: ICD-10-CM

## 2025-07-10 PROBLEM — H10.45 ALLERGIC CONJUNCTIVITIS: Status: RESOLVED | Noted: 2025-07-03 | Resolved: 2025-07-10

## 2025-07-10 PROCEDURE — 83861 MICROFLUID ANALY TEARS: CPT | Performed by: OPHTHALMOLOGY

## 2025-07-10 PROCEDURE — 99213 OFFICE O/P EST LOW 20 MIN: CPT | Performed by: OPHTHALMOLOGY

## 2025-07-10 PROCEDURE — 92250 FUNDUS PHOTOGRAPHY W/I&R: CPT | Performed by: OPHTHALMOLOGY

## 2025-07-10 ASSESSMENT — KERATOMETRY
OD_AXISANGLE_DEGREES: 112
OS_AXISANGLE_DEGREES: 080
OD_K1POWER_DIOPTERS: 44.25
OS_K2POWER_DIOPTERS: 44.50
METHOD_AUTO_MANUAL: AUTO
OS_K1POWER_DIOPTERS: 44.00
OD_K2POWER_DIOPTERS: 44.50

## 2025-07-10 ASSESSMENT — REFRACTION_AUTOREFRACTION
OD_CYLINDER: -0.75
OD_SPHERE: 0.00
OS_CYLINDER: -0.25
OS_SPHERE: +0.25
OD_AXIS: 087
OS_AXIS: 098

## 2025-07-10 ASSESSMENT — LID EXAM ASSESSMENTS
OD_MEIBOMITIS: RLL RUL 2+
OD_COMMENTS: +FLAKING
OS_BLEPHARITIS: T
OD_BLEPHARITIS: T
OS_MEIBOMITIS: LLL LUL 2+
OS_COMMENTS: +FLAKING

## 2025-07-10 ASSESSMENT — REFRACTION_MANIFEST
OD_ADD: +2.25
OS_CYLINDER: -0.50
OS_VA1: 20/20
OD_VA1: 20/20
OS_SPHERE: +2.00
OD_ADD: +2.25
OD_AXIS: 100
OS_AXIS: 85
OD_SPHERE: PL
OS_ADD: +2.25
OS_VA1: 20/20
OS_SPHERE: PLANO
OD_SPHERE: PLANO
OS_ADD: +2.25
OD_VA2: 20/20
OD_VA1: 20/20
OD_CYLINDER: -0.50
OS_VA2: 20/20

## 2025-07-10 ASSESSMENT — REFRACTION_CURRENTRX
OD_CYLINDER: -0.50
OS_ADD: +3.25
OS_ADD: +2.00
OD_OVR_VA: 20/
OS_VPRISM_DIRECTION: PROGS
OS_CYLINDER: -0.75
OD_ADD: +2.75
OS_AXIS: 077
OD_ADD: +2.00
OS_OVR_VA: 20/
OS_OVR_VA: 20/
OD_VPRISM_DIRECTION: PROGS
OD_OVR_VA: 20/
OD_SPHERE: +1.50
OD_AXIS: 092
OS_SPHERE: +2.50

## 2025-07-10 ASSESSMENT — SUPERFICIAL PUNCTATE KERATITIS (SPK)
OS_SPK: T
OD_SPK: 1+

## 2025-07-10 ASSESSMENT — CONFRONTATIONAL VISUAL FIELD TEST (CVF)
OS_FINDINGS: FULL
OD_FINDINGS: FULL

## 2025-07-10 ASSESSMENT — VISUAL ACUITY
OD_BCVA: 20/20
OS_BCVA: 20/20-1

## 2025-07-10 ASSESSMENT — TONOMETRY
OS_IOP_MMHG: 14
OD_IOP_MMHG: 15

## 2025-07-21 ENCOUNTER — OFFICE (OUTPATIENT)
Dept: URBAN - METROPOLITAN AREA CLINIC 94 | Facility: CLINIC | Age: 75
Setting detail: OPHTHALMOLOGY
End: 2025-07-21
Payer: MEDICAID

## 2025-07-21 DIAGNOSIS — H43.393: ICD-10-CM

## 2025-07-21 DIAGNOSIS — H35.373: ICD-10-CM

## 2025-07-21 PROBLEM — H01.005 BLEPHARITIS; RIGHT UPPER LID, RIGHT LOWER LID, LEFT UPPER LID, LEFT LOWER LID: Status: ACTIVE | Noted: 2025-07-03

## 2025-07-21 PROBLEM — H01.004 BLEPHARITIS; RIGHT UPPER LID, RIGHT LOWER LID, LEFT UPPER LID, LEFT LOWER LID: Status: ACTIVE | Noted: 2025-07-03

## 2025-07-21 PROBLEM — H01.001 BLEPHARITIS; RIGHT UPPER LID, RIGHT LOWER LID, LEFT UPPER LID, LEFT LOWER LID: Status: ACTIVE | Noted: 2025-07-03

## 2025-07-21 PROBLEM — H01.002 BLEPHARITIS; RIGHT UPPER LID, RIGHT LOWER LID, LEFT UPPER LID, LEFT LOWER LID: Status: ACTIVE | Noted: 2025-07-03

## 2025-07-21 PROCEDURE — 92235 FLUORESCEIN ANGRPH MLTIFRAME: CPT | Performed by: SPECIALIST

## 2025-07-21 PROCEDURE — 92134 CPTRZ OPH DX IMG PST SGM RTA: CPT | Performed by: SPECIALIST

## 2025-07-21 PROCEDURE — 92012 INTRM OPH EXAM EST PATIENT: CPT | Performed by: SPECIALIST

## 2025-07-21 ASSESSMENT — REFRACTION_AUTOREFRACTION
OS_SPHERE: +0.25
OS_CYLINDER: -0.25
OD_AXIS: 087
OS_AXIS: 098
OD_CYLINDER: -0.75
OD_SPHERE: 0.00

## 2025-07-21 ASSESSMENT — LID EXAM ASSESSMENTS
OD_COMMENTS: +FLAKING
OD_BLEPHARITIS: T
OS_COMMENTS: +FLAKING
OD_MEIBOMITIS: RLL RUL 2+
OS_BLEPHARITIS: T
OS_MEIBOMITIS: LLL LUL 2+

## 2025-07-21 ASSESSMENT — KERATOMETRY
METHOD_AUTO_MANUAL: AUTO
OS_K2POWER_DIOPTERS: 44.50
OD_K2POWER_DIOPTERS: 44.50
OD_AXISANGLE_DEGREES: 112
OS_K1POWER_DIOPTERS: 44.00
OD_K1POWER_DIOPTERS: 44.25
OS_AXISANGLE_DEGREES: 080

## 2025-07-21 ASSESSMENT — VISUAL ACUITY
OD_BCVA: 20/20
OS_BCVA: 20/20

## 2025-07-21 ASSESSMENT — TONOMETRY
OS_IOP_MMHG: 12
OD_IOP_MMHG: 12

## 2025-07-21 ASSESSMENT — SUPERFICIAL PUNCTATE KERATITIS (SPK)
OD_SPK: 1+
OS_SPK: T